# Patient Record
Sex: FEMALE | Race: WHITE | Employment: FULL TIME | URBAN - METROPOLITAN AREA
[De-identification: names, ages, dates, MRNs, and addresses within clinical notes are randomized per-mention and may not be internally consistent; named-entity substitution may affect disease eponyms.]

---

## 2017-01-21 ENCOUNTER — HOSPITAL ENCOUNTER (OUTPATIENT)
Dept: MAMMOGRAPHY | Age: 47
Discharge: HOME | End: 2017-01-21
Attending: FAMILY MEDICINE
Payer: COMMERCIAL

## 2017-01-21 DIAGNOSIS — Z12.31 VISIT FOR SCREENING MAMMOGRAM: ICD-10-CM

## 2017-01-21 PROCEDURE — 77067 SCR MAMMO BI INCL CAD: CPT

## 2017-07-31 PROBLEM — R05.9 COUGH: Status: ACTIVE | Noted: 2017-07-31

## 2018-02-14 PROBLEM — R05.9 COUGH: Status: RESOLVED | Noted: 2017-07-31 | Resolved: 2018-02-14

## 2018-02-24 ENCOUNTER — HOSPITAL ENCOUNTER (OUTPATIENT)
Dept: MAMMOGRAPHY | Age: 48
Discharge: HOME | End: 2018-02-24
Attending: FAMILY MEDICINE
Payer: COMMERCIAL

## 2018-02-24 DIAGNOSIS — Z12.31 VISIT FOR SCREENING MAMMOGRAM: ICD-10-CM

## 2018-02-24 PROCEDURE — 77063 BREAST TOMOSYNTHESIS BI: CPT

## 2019-01-22 PROBLEM — E66.01 SEVERE OBESITY (HCC): Status: ACTIVE | Noted: 2019-01-22

## 2019-04-13 ENCOUNTER — HOSPITAL ENCOUNTER (OUTPATIENT)
Dept: MAMMOGRAPHY | Age: 49
Discharge: HOME | End: 2019-04-13
Attending: FAMILY MEDICINE
Payer: COMMERCIAL

## 2019-04-13 DIAGNOSIS — Z12.39 ENCOUNTER FOR SCREENING BREAST EXAMINATION: ICD-10-CM

## 2019-04-13 PROCEDURE — 77063 BREAST TOMOSYNTHESIS BI: CPT

## 2019-05-20 ENCOUNTER — HOSPITAL ENCOUNTER (OUTPATIENT)
Dept: LAB | Age: 49
Discharge: HOME | End: 2019-05-20

## 2019-05-20 PROCEDURE — 88305 TISSUE EXAM BY PATHOLOGIST: CPT

## 2019-10-30 ENCOUNTER — HOSPITAL ENCOUNTER (OUTPATIENT)
Dept: PHYSICAL THERAPY | Age: 49
Discharge: HOME | End: 2019-10-30
Payer: COMMERCIAL

## 2019-10-30 PROCEDURE — 97161 PT EVAL LOW COMPLEX 20 MIN: CPT

## 2019-10-30 PROCEDURE — 97110 THERAPEUTIC EXERCISES: CPT

## 2019-10-30 NOTE — THERAPY EVALUATION
Mohit Nguyen  : 1970  Payor: Delia Augustin / [place] [facility] / Product Type: PPO /  [address] [place]  at [facility] Therapy  [address], [address]  [phone]   [phone]         OUTPATIENT PHYSICAL THERAPY:Initial Assessment 10/30/2019   ICD-10: Treatment Diagnosis: right knee instability M25.361,   Muscle weakness M62.81  Precautions/Allergies:   Patient has no known allergies. TREATMENT PLAN:  Effective Dates: 10/30/2019 TO 2019 (60 days). Frequency/Duration: 1 time a week for 60 Day(s) and upon reassessment, will adjust frequency and duration as progress indicates. MEDICAL/REFERRING DIAGNOSIS:  Other instability, right knee [M25.361]   DATE OF ONSET: over the summer and recently worsening over the past month  REFERRING PHYSICIAN: Jessy Vaca MD MD Orders: Eval and treat  Return MD Appointment: as needed     INITIAL ASSESSMENT:  Ms. Shannon Carranza presents with increased right knee buckling and feelings of instability in the right knee. She reports she has had no prior injury to the leg and it started back in the summer when exiting her pool by using the stairs. She reports just recently it worsened and was buckling almost every day. She feels hesitant to place all weight on her right leg. Today, she presented with mild quad weakness and will benefit from strengthening to help her prevent any further knee buckling. PROBLEM LIST (Impacting functional limitations):  1. Decreased Strength  2. Decreased Balance INTERVENTIONS PLANNED: (Treatment may consist of any combination of the following)  1. Balance Exercise  2. Home Exercise Program (HEP)  3. Therapeutic Exercise/Strengthening     GOALS: (Goals have been discussed and agreed upon with patient.)  Short-Term Functional Goals: Time Frame: 4 weeks  1. Establish independent HEP with no cuing.   2. Pt will be able to increase strength by 1/2 grade in order to prevent knee buckling with ambulation. 3. Pt will be able to report no difficulty with reciprocal stair climbing of 4-5 steps. Discharge Goals: Time Frame: 8 weeks  1. Pt will be able to improve score on LEFS by at least 5-8 points for advanced ADLs. 2. Pt will be able to increase strength by one full grade in order to prevent knee buckling with ambulation. 3. Pt will be able to demonstrate SLS on right LE up to 25 seconds to increase knee stability at the knee. OUTCOME MEASURE:   Tool Used: Lower Extremity Functional Scale (LEFS)  Score:  Initial: 51/80 Most Recent: X/80 (Date: -- )   Interpretation of Score: 20 questions each scored on a 5 point scale with 0 representing \"extreme difficulty or unable to perform\" and 4 representing \"no difficulty\". The lower the score, the greater the functional disability. 80/80 represents no disability. Minimal detectable change is 9 points. MEDICAL NECESSITY:   · Patient is expected to demonstrate progress in strength and balance to improve her feelings of safety and stability with standing, ambulating and climbing stairs. REASON FOR SERVICES/OTHER COMMENTS:  · Patient continues to require skilled intervention due to lack of full right LE strength needed for proper ambulation without knee instability. Total Duration:  PT Patient Time In/Time Out  Time In: 1210  Time Out: 1250    Rehabilitation Potential For Stated Goals: Good  Regarding Alexandrea Filomena therapy, I certify that the treatment plan above will be carried out by a therapist or under their direction. Thank you for this referral,  Faith Dick, PT     Referring Physician Signature: Sleena Sargent MD No Signature is Required for this note. PAIN/SUBJECTIVE:   Initial: Pain Intensity 1: 0  Post Session:  0/10   HISTORY:   History of Injury/Illness (Reason for Referral):  Ms. Braxton Gusman presents with increased right knee buckling and feelings of instability in the right knee.  She reports she has had no prior injury to the leg and it started back in the summer when exiting her pool by using the stairs. She reports just recently it worsened and was buckling almost every day. She feels hesitant to place all weight on her right leg. Today, she presented with mild quad weakness and will benefit from strengthening to help her prevent any further knee buckling. Past Medical History/Comorbidities:   Ms. Candelario Castañeda  has a past medical history of Depression, Fever blister, and Insomnia. Ms. Candelario Castañeda  has a past surgical history that includes hx hysterectomy (2006). Social History/Living Environment:     pt lives with spouse in one level home  Prior Level of Function/Work/Activity:  Pt is independent with ADL's/hobbies     Ambulatory/Rehab Services H2 Model Falls Risk Assessment   Risk Factors:       No Risk Factors Identified Ability to Rise from Chair:       (0)  Ability to rise in a single movement   Falls Prevention Plan:       No modifications necessary   Total: (5 or greater = High Risk): 0   ©2010 [facility] [place] Patent #[id number]. Federal Law prohibits the replication, distribution or use without written permission from [facility]   Current Medications:       Current Outpatient Medications:     sertraline (ZOLOFT) 100 mg tablet, 1 po qd, Disp: 30 Tab, Rfl: 11    LORazepam (ATIVAN) 1 mg tablet, 1 po bid, Disp: 60 Tab, Rfl: 5    valACYclovir (VALTREX) 1 gram tablet, Take 2 Tabs by mouth two (2) times a day.  X 1 day at onset fever blister   Indications: HERPES LABIALIS, Disp: 12 Tab, Rfl: 2   Date Last Reviewed:  10/30/2019   Number of Personal Factors/Comorbidities that affect the Plan of Care: 1-2: MODERATE COMPLEXITY        EXAMINATION:     ROM:        bilateral knee AROM WFL                                    Strength:     R hip flex 4/5, quad 4/5, HS 4/5, glut med 4/5         L LE 5/5            Special Tests: negative for anterior/posterior drawer testing, negative varus/valgus testing, negative McMurrays testing  Neurological Screen: na  Balance:  fair dynamic SLS      Body Structures Involved:  1. Joints  2. Muscles Body Functions Affected:  1. Neuromusculoskeletal  2. Movement Related Activities and Participation Affected:  1. General Tasks and Demands  2. Mobility  3.  Domestic Life   Number of elements (examined above) that affect the Plan of Care: 3: MODERATE COMPLEXITY   CLINICAL PRESENTATION:   Presentation: Stable and uncomplicated: LOW COMPLEXITY   CLINICAL DECISION MAKING:   Use of outcome tool(s) and clinical judgement create a POC that gives a: Clear prediction of patient's progress: LOW COMPLEXITY

## 2019-10-30 NOTE — PROGRESS NOTES
Oma Landeros  : 1970  Payor: Dee Dee Higuera / [place]: [place] [facility] / Product Type: PPO /  [address] at [facility] Therapy  [address], [address]  [phone]   [phone]      OUTPATIENT PHYSICAL THERAPY: Daily Treatment Note 10/30/2019  Visit Count:  1    ICD-10: Treatment Diagnosis: right knee instability M25.361,   Muscle weakness M62.81    Pre-treatment Symptoms/Complaints:  Pt reporting knee buckling at times. Pain: Initial: Pain Intensity 1: 0  Post Session:  0/10   Medications Last Reviewed:  10/30/2019  Updated Objective Findings:  See evaluation note from today   TREATMENT:   THERAPEUTIC EXERCISE: (20 minutes):  Exercises per grid below to improve strength and balance. Required minimal verbal cues to promote proper body mechanics. Progressed resistance, range and repetitions as indicated. LAQ green TB x 10  Hs curls green TB x 20  6 inch step up and over x 15  SLS on airex bilaterally 2x hold 20 sec  Wall slides x 15  Leg extension unilaterally 15# 2 x 10  Manual Therapy (  na    ): na    Therapeutic Modalities (  na   ):na    Evaluation (x)    HEP: As above; handouts given to patient for all exercises. Treatment/Session Summary:    · Response to Treatment:  Pt seen for evaluation. she presents with increased right LE weakness leading to knee instability and buckling at times. She progressed well today and learned exercises to perform at home and she will return one time per week. .  · Communication/Consultation:  None today  · Equipment provided today:  blue and black theraband  · Recommendations/Intent for next treatment session: Next visit will focus on strengthening as well as SLS tasks.   Treatment Plan of Care Effective Dates:  10/30/19 to 19  Total Treatment Billable Duration:  eval plus 20 min  PT Patient Time In/Time Out  Time In: 1210  Time Out: Bandar Loo PT    Future Appointments   Date Time Provider Ayo Maldonado   11/6/2019  8:00 AM [facility]   11/13/2019  1:00 PM [facility]   11/18/2019  1:00 PM [facility]arte [facility]

## 2019-11-06 ENCOUNTER — HOSPITAL ENCOUNTER (OUTPATIENT)
Dept: PHYSICAL THERAPY | Age: 49
Discharge: HOME | End: 2019-11-06
Payer: COMMERCIAL

## 2019-11-06 PROCEDURE — 97110 THERAPEUTIC EXERCISES: CPT

## 2019-11-11 ENCOUNTER — HOSPITAL ENCOUNTER (OUTPATIENT)
Dept: PHYSICAL THERAPY | Age: 49
Discharge: HOME | End: 2019-11-11
Payer: COMMERCIAL

## 2019-11-11 PROCEDURE — 97110 THERAPEUTIC EXERCISES: CPT

## 2019-11-11 NOTE — PROGRESS NOTES
Oma Landeros  : 1970  Payor: Dee Dee Higuera / [place] [facility] / Product Type: PPO /  [address] [place]  at [address]  [address], [address]  [phone]   [phone]      OUTPATIENT PHYSICAL THERAPY: Daily Treatment Note 2019  Visit Count:  3    ICD-10: Treatment Diagnosis: right knee instability M25.361,   Muscle weakness M62.81    Pre-treatment Symptoms/Complaints:  Patient reports knee isn't buckling, but hurting more on the inside. Pain: Initial: Pain Intensity 1: 0  Post Session:  0/10   Medications Last Reviewed:  2019  Updated Objective Findings:  None Today   TREATMENT:   THERAPEUTIC EXERCISE: (60 minutes):  Exercises per grid below to improve strength and balance. Required minimal verbal cues to promote proper body mechanics. Progressed resistance, range and repetitions as indicated. LAQ green TB x 10  Hs curls green TB x 20  6 inch step up and over x 15  SLS on airex bilaterally 2x hold 20 sec  Wall slides x 15  Leg extension unilaterally 15# 2 x 10  Standing hip abd  With # 4 2 x 12  Sit to stand without assistance 2 x 10  Lateral step ups on 6 inch # x 15  Nu step x 10 min level 2   Hip abd # 4 2 x 10  Knee ext machine # 10 2 x 10  resistive step ups on 6 inch step # 25 x10    Manual Therapy (  na    ): na    Therapeutic Modalities (  na   ):na      HEP: As directed    Treatment/Session Summary:    · Response to Treatment:  Patient tolerated treatment with no discomfort today. Patient demonstrated good effort with all exercises and was pleased that she could do the exercises and her knee felt stronger. Patient indicates that she is doing better with her home exercises. · Communication/Consultation:  None today  · Equipment provided today:  blue and black theraband  · Recommendations/Intent for next treatment session: Next visit will focus on strengthening as well as SLS tasks.   Treatment Plan of Care Effective Dates:  10/30/19 to 12/29/19  Total Treatment Billable Duration:  60 min  PT Patient Time In/Time Out  Time In: 0800  Time Out: 0900  Lonn Fails, PTA    Future Appointments   Date Time Provider Ayo Maldonado   11/18/2019  8:00 AM Edith Aragon [facility]

## 2019-11-18 ENCOUNTER — HOSPITAL ENCOUNTER (OUTPATIENT)
Dept: PHYSICAL THERAPY | Age: 49
Discharge: HOME | End: 2019-11-18
Payer: COMMERCIAL

## 2019-11-18 PROCEDURE — 97110 THERAPEUTIC EXERCISES: CPT

## 2019-11-18 NOTE — PROGRESS NOTES
Natalee Arshad  : 1970  Payor: Jagjit Dumont / Plan: [facility] / Product Type: PPO /  [address] [place]  at [address]  [address], [address]  [phone]   [phone]      OUTPATIENT PHYSICAL THERAPY: Daily Treatment Note 2019  Visit Count:  4    ICD-10: Treatment Diagnosis: right knee instability M25.361,   Muscle weakness M62.81    Pre-treatment Symptoms/Complaints:  Patient reports knee is feeling stronger not as weak as it was. Pain: Initial: Pain Intensity 1: 0  Post Session:  0/10   Medications Last Reviewed:  2019  Updated Objective Findings:  None Today   TREATMENT:   THERAPEUTIC EXERCISE: (60 minutes):  Exercises per grid below to improve strength and balance. Required minimal verbal cues to promote proper body mechanics. Progressed resistance, range and repetitions as indicated. LAQ green TB x 10  Hs curls green TB x 20  6 inch step up and over x 15  SLS on airex bilaterally 2x hold 20 sec  Wall slides x 15  Leg extension unilaterally 15# 2 x 10  Standing hip abd  With # 4 2 x 12  Sit to stand without assistance 2 x 10  Lateral step ups on 6 inch # x 15  Nu step x 10 min level 2   Hip abd # 4 2 x 10  Knee ext machine # 10 2 x 10  resistive step ups on 6 inch step # 25 x 15  Standing hamstring curls with # 4 2 x 12    Manual Therapy (  na    ): na    Therapeutic Modalities (  na   ):na      HEP: As directed    Treatment/Session Summary:    · Response to Treatment:  Patient tolerated treatment with no discomfort today. Patient continues to be pleased with her progress and would like to try a week on her own before she becomes independent with all exercises. · Communication/Consultation:  None today  · Equipment provided today:  blue and black theraband  · Recommendations/Intent for next treatment session: Next visit will focus on strengthening as well as SLS tasks.   Treatment Plan of Care Effective Dates: 10/30/19 to 12/29/19  Total Treatment Billable Duration:  60 min  PT Patient Time In/Time Out  Time In: 0800  Time Out: 0900  Neel Bajwa PTA    Future Appointments   Date Time Provider Ayo Maldonado   12/2/2019  8:00 AM [facility]

## 2019-12-02 ENCOUNTER — HOSPITAL ENCOUNTER (OUTPATIENT)
Dept: PHYSICAL THERAPY | Age: 49
Discharge: HOME | End: 2019-12-02
Payer: COMMERCIAL

## 2019-12-02 PROCEDURE — 97110 THERAPEUTIC EXERCISES: CPT

## 2019-12-02 NOTE — PROGRESS NOTES
Oma Landeros  : 1970  Payor: Dee Dee Higuera / [place] [facility] / Product Type: PPO /  [address] [place]  at [place] [address] Therapy  [address], [address]  [phone]   [phone]      OUTPATIENT PHYSICAL THERAPY: Daily Treatment Note 2019  Visit Count:  5    ICD-10: Treatment Diagnosis: right knee instability M25.361,   Muscle weakness M62.81    Pre-treatment Symptoms/Complaints:  Patient reports knee hasn't buckled once since her last visit. Pain: Initial: Pain Intensity 1: 0  Post Session:  0/10   Medications Last Reviewed:  2019  Updated Objective Findings:  None Today   TREATMENT:   THERAPEUTIC EXERCISE: (60 minutes):  Exercises per grid below to improve strength and balance. Required minimal verbal cues to promote proper body mechanics. Progressed resistance, range and repetitions as indicated. LAQ green TB x 10  Hs curls green TB x 20  6 inch step up and over x 15  SLS on airex bilaterally 2x hold 20 sec  Wall slides x 15  Leg extension unilaterally 15# 2 x 10  Standing hip abd  With # 4 2 x 12  Sit to stand without assistance 2 x 10  Lateral step ups on 6 inch # x 15  Nu step x 10 min level 2   Hip abd # 4 2 x 10  Knee ext machine # 10 2 x 10  resistive step ups on 6 inch step # 25 x 15  Standing hamstring curls with # 4 2 x 12  Resistive side stepping with # 35 x 8 bilateral    Manual Therapy (  na    ): na    Therapeutic Modalities (  na   ):na      HEP: As directed    Treatment/Session Summary:    · Response to Treatment:  Patient tolerated treatment with no discomfort today. Patient indicated that she road a bike 19 miles over the weekend and her knee felt great. Patient continues to be pleased with her progress and hopes after her next visit she will be able to be independent with all exercises.    · Communication/Consultation:  None today  · Equipment provided today:  None today and wedge for stretching. · Recommendations/Intent for next treatment session: Next visit will focus on strengthening as well as SLS tasks.   Treatment Plan of Care Effective Dates:  10/30/19 to 12/29/19  Total Treatment Billable Duration:  60 min  PT Patient Time In/Time Out  Time In: 0800  Time Out: 0900  Luis Manuel Obrien PTA    Future Appointments   Date Time Provider Ayo Maldonado   12/4/2019 12:00 PM Fransico Weller, PT [facility]

## 2019-12-09 ENCOUNTER — HOSPITAL ENCOUNTER (OUTPATIENT)
Dept: PHYSICAL THERAPY | Age: 49
Discharge: HOME | End: 2019-12-09
Payer: COMMERCIAL

## 2019-12-09 PROCEDURE — 97110 THERAPEUTIC EXERCISES: CPT

## 2019-12-09 NOTE — THERAPY DISCHARGE
Uday Tyler  : 1970  Payor: Lili Earing / Plan: [facility] / Product Type: PPO /  [address] [place]  at [facility] Therapy  [address], [address]  [phone]   [phone]         OUTPATIENT PHYSICAL THERAPY:Discharge Summary 2019   ICD-10: Treatment Diagnosis: right knee instability M25.361,   Muscle weakness M62.81  Precautions/Allergies:   Patient has no known allergies. TREATMENT PLAN:  Effective Dates: 10/30/2019 TO 2019 (60 days). Frequency/Duration: 1 time a week for 60 Day(s) and upon reassessment, will adjust frequency and duration as progress indicates. MEDICAL/REFERRING DIAGNOSIS:  Other instability, right knee [M25.361]   DATE OF ONSET: over the summer and recently worsening over the past month  REFERRING PHYSICIAN: Lydia Alejandre MD MD Orders: Eval and treat  Return MD Appointment: as needed     INITIAL ASSESSMENT:  Ms. Michael Resendez presents with increased right knee buckling and feelings of instability in the right knee. She reports she has had no prior injury to the leg and it started back in the summer when exiting her pool by using the stairs. She reports just recently it worsened and was buckling almost every day. She feels hesitant to place all weight on her right leg. Today, she presented with mild quad weakness and will benefit from strengthening to help her prevent any further knee buckling. Assessment as of : Pt presenting with no pain or knee buckling over the past several weeks. She has 4+/5 strength and is able to walk and ride bicycle without difficulty. She has met her therapy goals and was discharged today. PROBLEM LIST (Impacting functional limitations):  1. Decreased Strength  2. Decreased Balance INTERVENTIONS PLANNED: (Treatment may consist of any combination of the following)  1. Balance Exercise  2. Home Exercise Program (HEP)  3.  Therapeutic Exercise/Strengthening     GOALS: (Goals have been discussed and agreed upon with patient.)  Short-Term Functional Goals: Time Frame: 4 weeks  1. Establish independent HEP with no cuing.-met  2. Pt will be able to increase strength by 1/2 grade in order to prevent knee buckling with ambulation. -met  3. Pt will be able to report no difficulty with reciprocal stair climbing of 4-5 steps. -met  Discharge Goals: Time Frame: 8 weeks  1. Pt will be able to improve score on LEFS by at least 5-8 points for advanced ADLs. -met  2. Pt will be able to increase strength by one full grade in order to prevent knee buckling with ambulation. -met  3. Pt will be able to demonstrate SLS on right LE up to 25 seconds to increase knee stability at the knee. -met    OUTCOME MEASURE:   Tool Used: Lower Extremity Functional Scale (LEFS)  Score:  Initial: 51/80 Most Recent: -80/80 (Date: 12/9/19 )   Interpretation of Score: 20 questions each scored on a 5 point scale with 0 representing \"extreme difficulty or unable to perform\" and 4 representing \"no difficulty\". The lower the score, the greater the functional disability. 80/80 represents no disability. Minimal detectable change is 9 points.     Thank you for this referral,  Sloane Eckert, PT

## 2019-12-09 NOTE — PROGRESS NOTES
Josephine Primer  : 1970  Payor: Brian Clayton / [place] [facility] / Product Type: PPO /  [address] [place]  at [address], [address]  [phone]   [phone]      OUTPATIENT PHYSICAL THERAPY: Daily Treatment Note 2019  Visit Count:  6    ICD-10: Treatment Diagnosis: right knee instability M25.361,   Muscle weakness M62.81    Pre-treatment Symptoms/Complaints:  Patient reports no issues. Pain: Initial: Pain Intensity 1: 0  Post Session:  0/10   Medications Last Reviewed:  2019  Updated Objective Findings:  see discharge summary   TREATMENT:   THERAPEUTIC EXERCISE: (44 minutes):  Exercises per grid below to improve strength and balance. Required minimal verbal cues to promote proper body mechanics. Progressed resistance, range and repetitions as indicated. Bike x 6 min  Hs curls 20# x 20  Bilateral hip abduction 25# x 20  Bilateral hip extension 37.5# x 20  Mini squats x 20  Sit-stand x 15  8 inch step up x 25  Slant board 3x hold 30 sec  HS stretching bilaterally 4x hold 20 sec  Leg extension 15# x 25      Manual Therapy (  na    ): na    Therapeutic Modalities (  na   ):na      HEP: As directed    Treatment/Session Summary:    · Response to Treatment:  Patient reporting no increased pain and no knee buckling over the past several weeks. She is ready for discharge and has HEP and bands to continue using at home. · Communication/Consultation:  None today  Treatment Plan of Care Effective Dates:  10/30/19 to 19  Total Treatment Billable Duration:  44 min  PT Patient Time In/Time Out  Time In: 1200  Time Out: 830 [facility], PT    No future appointments.

## 2020-01-08 ENCOUNTER — HOSPITAL ENCOUNTER (OUTPATIENT)
Dept: PHYSICAL THERAPY | Age: 50
Discharge: HOME | End: 2020-01-08
Payer: COMMERCIAL

## 2020-01-08 PROCEDURE — 97162 PT EVAL MOD COMPLEX 30 MIN: CPT

## 2020-01-08 PROCEDURE — 97012 MECHANICAL TRACTION THERAPY: CPT

## 2020-01-08 NOTE — THERAPY EVALUATION
Humberto Arias  : 1970  Payor: Samy Ryan / Plan: [facility] / Product Type: PPO /  [address]  at [facility] Therapy  [address], [facility], [address]  [phone]   [phone]         OUTPATIENT PHYSICAL THERAPY:Initial Assessment 2020   ICD-10: Treatment Diagnosis: cervicalgia M54.2, cervical disorder with radiculopathy M50.12, muscle spasm of neck M62.838  Precautions/Allergies:   Patient has no known allergies. TREATMENT PLAN:  Effective Dates: 2020 TO 3/8/2020 (60 days). Frequency/Duration: 2 times a week for 60 Day(s) and upon reassessment, will adjust frequency and duration as progress indicates. MEDICAL/REFERRING DIAGNOSIS:  Cervicalgia [M54.2]   DATE OF ONSET: before Y[place]  REFERRING PHYSICIAN: Bing Sarah MD MD Orders: Eval and treat  Return MD Appointment: as needed     INITIAL ASSESSMENT:  Ms. Jono Florian presents with severe left sided cervical, shoulder blade and L UE pain due to effects of DDD with disc bulging and cord contact from roughly C3-C7. She reported feeling this way a week before [place] and her pain has continued since then. She has had prior cervical pain, but never this severe. She reported helping her son lift her garage door and then she worked heavily in her bathroom breaking tile and carrying tile. She thinks these two activities may have exacerbated her pain levels. She has undergone MRI and is being referred to Dr. Harriett Gasca next Monday. She has not been able to wash her hair, back or perform housework or her daily job. PROBLEM LIST (Impacting functional limitations):  1. Decreased Strength  2. Decreased ADL/Functional Activities  3. Increased Pain  4. Decreased Flexibility/Joint Mobility INTERVENTIONS PLANNED: (Treatment may consist of any combination of the following)  1. Electrical Stimulation  2. Heat  3. Home Exercise Program (HEP)  4. Manual Therapy  5.  Range of Motion (ROM)  6. Therapeutic Exercise/Strengthening  7. Transcutaneous Electrical Nerve Stimulation (TENS)  8. Ultrasound (US)  9. cervical traction     GOALS: (Goals have been discussed and agreed upon with patient.)  Short-Term Functional Goals: Time Frame: 4 weeks  1. Establish independent HEP with no cuing. 2. Pt will be able to wash her hair and back. 3. Pt will be able to return to work. Discharge Goals: Time Frame: 8 weeks  1. Pt will be able to improve score on NDI by at least 5 points for advanced ADL's.  2. Pt will be able to improve cervical ROM by at least 10 degrees for improved neck rotation for driving. 3. Pt will be able to decrease pain to at least 3/10 with daily tasks. 4. Pt will be able to resume housework and cooking. OUTCOME MEASURE:   Tool Used: Neck Disability Index (NDI)  Score:  Initial: 36/50  Most Recent: X/50 (Date: -- )   Interpretation of Score: The Neck Disability Index is a revised form of the Oswestry Low Back Pain Index and is designed to measure the activities of daily living in person's with neck pain. Each section is scored on a 0-5 scale, 5 representing the greatest disability. The scores of each section are added together for a total score of 50. MEDICAL NECESSITY:   · Patient is expected to demonstrate progress in strength and range of motion to improve pain levels and allow pt to return to her normal ADL's and to return to work. REASON FOR SERVICES/OTHER COMMENTS:  · Patient continues to require skilled intervention due to severe pain limiting her ability to sleep, perform her job and perform her normal housework. Total Duration:  PT Patient Time In/Time Out  Time In: 1145  Time Out: 1255    Rehabilitation Potential For Stated Goals: Good  Regarding Terrosy Hornneemam therapy, I certify that the treatment plan above will be carried out by a therapist or under their direction.   Thank you for this referral,  Joao Gaming, PT     Referring Physician Signature: Gonzalo Hager, Javy Chinchilla MD No Signature is Required for this note. PAIN/SUBJECTIVE:   Initial: Pain Intensity 1: 9  Post Session:  8/10   HISTORY:   History of Injury/Illness (Reason for Referral):  Ms. Rakan Albright presents with severe left sided cervical, shoulder blade and L UE pain due to effects of DDD with disc bulging and cord contact from roughly C3-C7. She reported feeling this way a week before Christmas and her pain has continued since then. She has had prior cervical pain, but never this severe. She reported helping her son lift her garage door and then she worked heavily in her bathroom breaking tile and carrying tile. She thinks these two activities may have exacerbated her pain levels. She has undergone MRI and is being referred to Dr. Ady Renner next Monday. She has not been able to wash her hair, back or perform housework or her daily job. She did have one deep tissue massage with only mid relief and four adjustments by chiropractor without much relief. Past Medical History/Comorbidities:   Ms. Rakan Albright  has a past medical history of Depression, Fever blister, and Insomnia. Ms. Rakan Albright  has a past surgical history that includes hx hysterectomy (2006). 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  Federal Law prohibits the replication, distribution or use without written permission from [facility] [employer]   Current Medications:       Current Outpatient Medications:     sertraline (ZOLOFT) 100 mg tablet, 1 po qd, Disp: 30 Tab, Rfl: 11    LORazepam (ATIVAN) 1 mg tablet, 1 po bid, Disp: 60 Tab, Rfl: 5    valACYclovir (VALTREX) 1 gram tablet, Take 2 Tabs by mouth two (2) times a day. X 1 day at onset fever blister   Indications: HERPES LABIALIS, Disp: 12 Tab, Rfl: 2   Flexiril, gabapentin, aleve, oxycodone   Date Last Reviewed:  1/8/2020     Number of Personal Factors/Comorbidities that affect the Plan of Care: 1-2: MODERATE COMPLEXITY        EXAMINATION:   Palpation:          Increased pain and tightness noted at left upper trap as well as medial border of left scapula  ROM:     CROM-FB WFL today with pain  R Rot 42 ° with pain  L Rot 25 ° with pain  Bilateral SB WFL and less painful     R UE WFL  L UE WFL, but guarded with motions                           Strength:     R UE 5/5         L UE flex 3/5, abd 3-/5, ER 3-/5, IR 3/5, biceps 4-/5, triceps 4-/5            Special Tests: na  Neurological Screen: positive for radicular symptoms in left UE- left index finger numb over the past week  Balance:  good      Body Structures Involved:  1. Nerves  2. Bones  3. Joints  4. Muscles Body Functions Affected:  1. Sensory/Pain  2. Neuromusculoskeletal  3. Movement Related Activities and Participation Affected:  1. General Tasks and Demands  2. Self Care  3. Domestic Life  4. Interpersonal Interactions and Relationships  5.  Community, Social and [place]   Number of elements (examined above) that affect the Plan of Care: 4+: HIGH COMPLEXITY   CLINICAL PRESENTATION:   Presentation: Evolving clinical presentation with changing clinical characteristics: MODERATE COMPLEXITY   CLINICAL DECISION MAKING:   Use of outcome tool(s) and clinical judgement create a POC that gives a: Questionable prediction of patient's progress: MODERATE COMPLEXITY

## 2020-01-08 NOTE — PROGRESS NOTES
Nela Alamo  : 1970  Payor: Wiley Santos / [place] [facility] / Product Type: PPO /  [address] at [facility] Therapy  [address], [address]  [phone]   [phone]      OUTPATIENT PHYSICAL THERAPY: Daily Treatment Note 2020  Visit Count:  1    ICD-10: Treatment Diagnosis: cervicalgia M54.2, cervical disorder with radiculopathy M50.12, muscle spasm of neck M62.838    Pre-treatment Symptoms/Complaints:  Pt reporting feeling severe pain on left side of neck and into left UE. She is very guarded  Pain: Initial: Pain Intensity 1: 9  Post Session:  8/10   Medications Last Reviewed:  2020  Updated Objective Findings:  See evaluation note from today   TREATMENT:   THERAPEUTIC EXERCISE: (0 minutes):  Exercises per grid below to improve mobility. Required minimal verbal cues to promote proper body mechanics. Progressed resistance, range and repetitions as indicated. Manual Therapy ( na     ): na    Therapeutic Modalities (    30 min ):cervical traction x 15 minutes at 20# for improved pain control followed by heat and premod e-stim to left cervical and shoulder region x 15 min. Pt checked on for comfort    Evaluation (x)    HEP: As above; handouts given to patient for all exercises. Treatment/Session Summary:    · Response to Treatment:  Pt seen for evaluation today. She had severe pain in the neck and left UE. She received traction and e-stim to help decrease her high pain levels. Post treatment, gross ROM observation showed slight imrpovement in bilateral rotation and extension. She still has severe pain levels. She will perform light ROM exercises at home and continue use of meds and heat at home. · Communication/Consultation:  None today  · Equipment provided today:  None today  · Recommendations/Intent for next treatment session: Next visit will focus on traction, e-stim, manual therapy.   Treatment Plan of Care Effective Dates:  1/8/2020 to 3/8/2020  Total Treatment Billable Duration:  eval plus 15 min  PT Patient Time In/Time Out  Time In: 1730  Time Out: 57 Radha Bauer PT    Future Appointments   Date Time Provider Ayo Maldonado   1/10/2020 12:15 PM Noel Cardona, PT [facility]   1/13/2020  1:00 PM Júnior Whitney [facility]   1/16/2020 11:00 AM Noel Cardona, PT [facility]

## 2020-01-10 ENCOUNTER — HOSPITAL ENCOUNTER (OUTPATIENT)
Dept: PHYSICAL THERAPY | Age: 50
Discharge: HOME | End: 2020-01-10
Payer: COMMERCIAL

## 2020-01-10 PROCEDURE — 97140 MANUAL THERAPY 1/> REGIONS: CPT

## 2020-01-10 PROCEDURE — 97012 MECHANICAL TRACTION THERAPY: CPT

## 2020-01-10 NOTE — PROGRESS NOTES
Trinity Rosenbergsitavíctor  : 1970  Payor: Delena Boeck / [place] [facility] / Product Type: PPO /  [address]  at [facility] Therapy  [address], [address]  [phone]   [phone]      OUTPATIENT PHYSICAL THERAPY: Daily Treatment Note 1/10/2020  Visit Count:  2    ICD-10: Treatment Diagnosis: cervicalgia M54.2, cervical disorder with radiculopathy M50.12, muscle spasm of neck M62.838    Pre-treatment Symptoms/Complaints:  Pt reporting her left arm is now tingling and has pins and needles sensation in it. Pain: Initial: Pain Intensity 1: 9  Post Session:  8/10   Medications Last Reviewed:  1/10/2020  Updated Objective Findings:  None Today   TREATMENT:   THERAPEUTIC EXERCISE: (5 minutes):  Exercises per grid below to improve mobility. Required minimal verbal cues to promote proper body mechanics. Progressed resistance, range and repetitions as indicated. Supine cervical retractions x 15 and review of HEP  Manual Therapy ( 15 min    ): manual traction and shoulder depression stretching. Pt in supine    Therapeutic Modalities (    30 min ):cervical traction x 15 minutes at 15# for improved pain control followed by heat and 2 channel premod e-stim to left cervical and shoulder region x 15 min. Pt checked on for comfort  5 min set up for traction today to help pt get more comfortable  HEP: continue as directed    Treatment/Session Summary:    · Response to Treatment:  Pt received light manual traction on table with shoulder depression stretching. She felt better with the shoulder depression stretching. she continues to have paresthesia down the left arm and it seems to have intensified. post traction today, pt did not report any arm pain or numbness or tingling. She will continue to monitor this and will see Beverley on Monday. .  · Communication/Consultation:  None today  · Equipment provided today:  None today  · Recommendations/Intent for next treatment session: Next visit will focus on traction, e-stim, manual therapy.   Treatment Plan of Care Effective Dates:  1/8/2020 to 3/8/2020  Total Treatment Billable Duration:  55 min  PT Patient Time In/Time Out  Time In: 5221  Time Out: 9 Juliane Rodriguez, PT    Future Appointments   Date Time Provider Ayo Maldonado   1/14/2020 10:45 AM ZACHARIAH Hemphill   1/16/2020 11:00 [facility]

## 2020-01-13 ENCOUNTER — APPOINTMENT (OUTPATIENT)
Dept: PHYSICAL THERAPY | Age: 50
End: 2020-01-13
Payer: COMMERCIAL

## 2020-01-14 ENCOUNTER — HOSPITAL ENCOUNTER (OUTPATIENT)
Dept: PHYSICAL THERAPY | Age: 50
Discharge: HOME | End: 2020-01-14
Payer: COMMERCIAL

## 2020-01-14 PROCEDURE — 97140 MANUAL THERAPY 1/> REGIONS: CPT

## 2020-01-14 PROCEDURE — 97012 MECHANICAL TRACTION THERAPY: CPT

## 2020-01-14 NOTE — PROGRESS NOTES
Nela Alamo  : 1970  Payor: Wiley Santos / [place] [facility] / Product Type: PPO /  [address]  at [place] [address] Therapy  [address], [address]  [phone]   [phone]      OUTPATIENT PHYSICAL THERAPY: Daily Treatment Note 2020  Visit Count:  3    ICD-10: Treatment Diagnosis: cervicalgia M54.2, cervical disorder with radiculopathy M50.12, muscle spasm of neck M62.838    Pre-treatment Symptoms/Complaints:  Pt reporting no current arm pain or tingling. She did see Dr. Tristin Bell and he diagnosed pt with herniated disc. She is to take prednisone and continue with therapy. She will see him again in the next several weeks. Pain: Initial: Pain Intensity 1: 5  Post Session:  510   Medications Last Reviewed:  2020  Updated Objective Findings:  None Today   TREATMENT:   THERAPEUTIC EXERCISE: (5 minutes):  Exercises per grid below to improve mobility. Required minimal verbal cues to promote proper body mechanics. Progressed resistance, range and repetitions as indicated. Supine cervical retractions x 15 and review of HEP  Manual Therapy ( 15 min    ): manual traction and shoulder depression stretching. Pt in supine  Manual cervical retraction/extension mobs 2 x 10  Therapeutic Modalities (    30 min ):cervical traction x 15 minutes at 15# for improved pain control followed by heat and 2 channel premod e-stim to left cervical and shoulder region x 15 min. Pt checked on for comfort  5 min set up for traction today to help pt get more comfortable  HEP: continue as directed    Treatment/Session Summary:    · Response to Treatment:  Pt reporting 5/10 pain rating versuse 9/10 pain rating today. she was able to return to 1/2 days at work, but by the end of working 4 hours she has increased cervical pain. She has moved her computer monitor to avoid excessive cervical extension.   .  · Communication/Consultation:  None today  · Equipment provided today:  None today  · Recommendations/Intent for next treatment session: Next visit will focus on traction, e-stim, manual therapy.   Treatment Plan of Care Effective Dates:  1/8/2020 to 3/8/2020  Total Treatment Billable Duration:  55 min  PT Patient Time In/Time Out  Time In: 8339  Time Out: Alexander 35, PT    Future Appointments   Date Time Provider Ayo Maldonado   1/16/2020 11:00 AM Cal Reyes, PT [facility]   1/20/2020 10:00 AM Choudhury [place] [facility]   1/23/2020 10:00 AM Choudhury [place] [facility]

## 2020-01-16 ENCOUNTER — HOSPITAL ENCOUNTER (OUTPATIENT)
Dept: PHYSICAL THERAPY | Age: 50
Discharge: HOME | End: 2020-01-16
Payer: COMMERCIAL

## 2020-01-16 PROCEDURE — 97012 MECHANICAL TRACTION THERAPY: CPT

## 2020-01-16 PROCEDURE — 97140 MANUAL THERAPY 1/> REGIONS: CPT

## 2020-01-16 NOTE — PROGRESS NOTES
Nela Alamo  : 1970  Payor: Wiley Santos / [place] [facility] / Product Type: PPO /  [address] at [facility] Therapy  [address], [address]  [phone]   [phone]      OUTPATIENT PHYSICAL THERAPY: Daily Treatment Note 2020  Visit Count:  4    ICD-10: Treatment Diagnosis: cervicalgia M54.2, cervical disorder with radiculopathy M50.12, muscle spasm of neck M62.838    Pre-treatment Symptoms/Complaints:  Pt reporting she is seeing some improvement. The neck is still very stiff and painful at times. She currently has no arm pain. She has been working 1/2 days thus far. Pain: Initial: Pain Intensity 1: 5  Post Session:  5/10   Medications Last Reviewed:  2020  Updated Objective Findings:  None Today   TREATMENT:   THERAPEUTIC EXERCISE: (5 minutes):  Exercises per grid below to improve mobility. Required minimal verbal cues to promote proper body mechanics. Progressed resistance, range and repetitions as indicated. Supine cervical retractions x 15 and review of HEP  Manual Therapy ( 20 min    ): manual traction and shoulder depression stretching. Pt in supine  Manual cervical retraction/extension mobs 2 x 10. Light STM and shoulder depression in sideyling with scap mobs to work on decreasing tension around the scapula  Therapeutic Modalities (    30 min ):cervical traction x 20 minutes at 19# for improved pain control followed by heat and 2 channel premod e-stim to left cervical and shoulder region x 15 min. Pt checked on for comfort  5 min set up for traction today to help pt get more comfortable  HEP: add upper trap stretching    Treatment/Session Summary:    · Response to Treatment:  Pt reporting feeling intermittent stiffness. No current arm pain. She is concerned she might have to have surgery. Pt has been diligent in performing HEP and using heat and medication to decrease her pain levels.   She appears more alert and can now move her neck up and down with improved ROM and less pain. She is only working 1/2 days currently. We will begin to add in more stretching and possibly light strengthening if her pain levels continue to improve.   · Communication/Consultation:  None today  · Equipment provided today:  None today  · Recommendations/Intent for next treatment session: Next visit will focus on traction, e-stim, manual therapy, light stretching and strengthening  Treatment Plan of Care Effective Dates:  1/8/2020 to 3/8/2020  Total Treatment Billable Duration:  50 min  PT Patient Time In/Time Out  Time In: 1100  Time Out: Bandar Loo, PT    Future Appointments   Date Time Provider Ayo Maldonado   1/20/2020 10:00 AM Coco Harris [facility]   1/23/2020 10:00 AM Coco Harris [facility]

## 2020-01-20 ENCOUNTER — HOSPITAL ENCOUNTER (OUTPATIENT)
Dept: PHYSICAL THERAPY | Age: 50
Discharge: HOME | End: 2020-01-20
Payer: COMMERCIAL

## 2020-01-20 PROCEDURE — 97140 MANUAL THERAPY 1/> REGIONS: CPT

## 2020-01-20 PROCEDURE — 97014 ELECTRIC STIMULATION THERAPY: CPT

## 2020-01-20 PROCEDURE — 97012 MECHANICAL TRACTION THERAPY: CPT

## 2020-01-20 NOTE — PROGRESS NOTES
Rosamond Meckel  : 1970  Payor: Amanda Del Toro / Plan: [facility] / Product Type: PPO /  [address] at [facility] Therapy  [address], [address]  [phone]   [phone]      OUTPATIENT PHYSICAL THERAPY: Daily Treatment Note 2020  Visit Count:  5    ICD-10: Treatment Diagnosis: cervicalgia M54.2, cervical disorder with radiculopathy M50.12, muscle spasm of neck M62.838    Pre-treatment Symptoms/Complaints:  Patient reports she had a really rough weekend with her neck and down in her shoulder. Pain: Initial: Pain Intensity 1: 5  Post Session:  4/10   Medications Last Reviewed:  2020  Updated Objective Findings:  None Today   TREATMENT:   THERAPEUTIC EXERCISE: (5 minutes):  Exercises per grid below to improve mobility. Required minimal verbal cues to promote proper body mechanics. Progressed resistance, range and repetitions as indicated. Supine cervical retractions x 15 and review of HEP  Manual Therapy ( 20 min    ): manual traction and shoulder depression stretching. Pt in supine  Manual cervical retraction/extension mobs 2 x 10. Light STM and shoulder depression in sideyling with scap mobs to work on decreasing tension around the scapula  Therapeutic Modalities (    35 min ):cervical traction x 20 minutes at 19# for improved pain control followed by heat and 2 channel premod e-stim to left cervical and shoulder region x 15 min. Pt checked on for comfort    HEP: add upper trap stretching    Treatment/Session Summary:    · Response to Treatment:  Patient tolerated treatment with mild discomfort today. She reports that she has had some tingling down her arm. Tried taping today to see if that would give her some discomfort while at work. Patient continues to be diligent with all home exercises.   · Communication/Consultation:  None today  · Equipment provided today:  None today  · Recommendations/Intent for next treatment session: Next visit will focus on traction, e-stim, manual therapy, light stretching and strengthening  Treatment Plan of Care Effective Dates:  1/8/2020 to 3/8/2020  Total Treatment Billable Duration:  60 min  PT Patient Time In/Time Out  Time In: 1000  Time Out: [address] [place], [place]    Future Appointments   Date Time Provider Ayo Maldonado   1/23/2020 10:00 AM [facility]   1/27/2020 10:15 AM [place] Galliano, PT [facility]   1/30/2020  1:15 PM [place] Galliano, PT [facility]   2/4/2020 12:15 PM [place] Galliano, PT [facility]   2/7/2020 12:15 PM [place] Galliano, PT [facility]   2/11/2020 12:15 PM [place] Galliano, PT [facility]   2/14/2020 12:15 PM [place] Galliano, PT [facility]

## 2020-01-23 ENCOUNTER — HOSPITAL ENCOUNTER (OUTPATIENT)
Dept: PHYSICAL THERAPY | Age: 50
Discharge: HOME | End: 2020-01-23
Payer: COMMERCIAL

## 2020-01-23 PROCEDURE — 97012 MECHANICAL TRACTION THERAPY: CPT

## 2020-01-23 PROCEDURE — 97110 THERAPEUTIC EXERCISES: CPT

## 2020-01-23 PROCEDURE — 97140 MANUAL THERAPY 1/> REGIONS: CPT

## 2020-01-23 NOTE — PROGRESS NOTES
Milderd Majestic  : 1970  Payor: Bianca Turk / Plan: [facility] [employer] [facility] / Product Type: PPO /  [address]  at [facility] Therapy  [address], [address]  [phone]   [phone]      OUTPATIENT PHYSICAL THERAPY: Daily Treatment Note 2020  Visit Count:  6    ICD-10: Treatment Diagnosis: cervicalgia M54.2, cervical disorder with radiculopathy M50.12, muscle spasm of neck M62.838    Pre-treatment Symptoms/Complaints:  Patient reports she is feeling better. Slight numbness in left index finger remains and slight headache on top of the head. She appears to be more comfortable and less guarded today. Pain: Initial: Pain Intensity 1: 4  Post Session:  3/10   Medications Last Reviewed:  2020  Updated Objective Findings:  None Today   TREATMENT:   THERAPEUTIC EXERCISE: (10 minutes):  Exercises per grid below to improve mobility. Required minimal verbal cues to promote proper body mechanics. Progressed resistance, range and repetitions as indicated. Scap retract blue TB 2 x 10  Cervical retraction with rotation x 10  Prone on elbows with cervical extension x 10 hold 5 sec  Manual Therapy ( 15 min    ): manual traction and shoulder depression stretching. Pt in supine  Manual cervical retraction/extension mobs 2 x 10. Light STM and shoulder depression in sideyling with scap mobs to work on decreasing tension around the scapula  Therapeutic Modalities (    30 min ):cervical traction x 15 minutes at 20# for improved pain control followed by heat and 2 channel premod e-stim to left cervical and shoulder region x 15 min. Pt checked on for comfort    HEP: add scap retract    Treatment/Session Summary:    · Response to Treatment:  Patient reporting feeling less overall neck and arm pain. She is much more comfortable today and has been able to return to working full days. She has a riser desk that she can adjust to stand or to sit down.   She does have some residual numbness in the left index finger pad. She also feels tight at times in the neck. She can now turn more comfortably to the right and left to look over her shoulders. She is progressing well and was able to complete new exercises todayt without increased pain.  Communication/Consultation:  None today  · Equipment provided today:  None today  · Recommendations/Intent for next treatment session: Next visit will focus on traction, e-stim, manual therapy, light stretching and strengthening  Treatment Plan of Care Effective Dates:  1/8/2020 to 3/8/2020  Total Treatment Billable Duration:  60 min  PT Patient Time In/Time Out  Time In: 0100  Time Out: Kojo Mckinley [place] [address], PT    Future Appointments   Date Time Provider Ayo Maldonado   1/27/2020 10:15 AM Matt Rung, PT [facility]   1/30/2020  1:15 PM Matt Rung, PT [facility]   2/4/2020 11:00 AM Matt Rung, PT [facility]   2/7/2020 11:00 AM Matt Rung, PT [facility]   2/11/2020 11:15 AM Matt Rung, PT [facility]   2/14/2020 11:15 AM Matt Rung, PT [facility]

## 2020-01-27 ENCOUNTER — HOSPITAL ENCOUNTER (OUTPATIENT)
Dept: PHYSICAL THERAPY | Age: 50
Discharge: HOME | End: 2020-01-27
Payer: COMMERCIAL

## 2020-01-27 PROCEDURE — 97014 ELECTRIC STIMULATION THERAPY: CPT

## 2020-01-27 PROCEDURE — 97110 THERAPEUTIC EXERCISES: CPT

## 2020-01-27 PROCEDURE — 97140 MANUAL THERAPY 1/> REGIONS: CPT

## 2020-01-27 PROCEDURE — 97035 APP MDLTY 1+ULTRASOUND EA 15: CPT

## 2020-01-27 NOTE — PROGRESS NOTES
Trinity Liu  : 1970  Payor: Delena Boeck / Plan: [facility] / Product Type: PPO /  [address]  at [facility]  [address], [address]  [phone]   [phone]      OUTPATIENT PHYSICAL THERAPY: Daily Treatment Note 2020  Visit Count:  7    ICD-10: Treatment Diagnosis: cervicalgia M54.2, cervical disorder with radiculopathy M50.12, muscle spasm of neck M62.838    Pre-treatment Symptoms/Complaints:  Patient reports she has been sore in the left pectoral region along T1 dermatome. Pain: Initial: Pain Intensity 1: 4  Post Session:  3/10   Medications Last Reviewed:  2020  Updated Objective Findings:  None Today   TREATMENT:   THERAPEUTIC EXERCISE: (15 minutes):  Exercises per grid below to improve mobility. Required minimal verbal cues to promote proper body mechanics. Progressed resistance, range and repetitions as indicated. Scap retract blue TB 2 x 10  Shoulder pull downs green TB x 20  Cervical retraction with rotation x 10-held  Nu-step x 7 min  Manual Therapy ( 15 min    ): manual traction and shoulder depression stretching. Pt in supine  Manual cervical retraction/extension mobs 2 x 10. Light STM and shoulder depression in sideyling with scap mobs to work on decreasing tension around the scapula  Therapeutic Modalities (    35 min ):cervical traction x 20 minutes at 20# for improved pain control followed by heat and 2 channel premod e-stim to left cervical and shoulder region x 15 min. Pt checked on for comfort    HEP: na    Treatment/Session Summary:    · Response to Treatment:  Patient reporting no increased pain with treatment today. She did report feeling some soreness in the left pectoral region over the weekend and after last treatment session. Today, we progressed pt to nu-step for upper arm motions and to shoulder pull downs to work on arm strength and stressing the arm and neck musculature.   She is progressing well thus far and is now more relaxed in the neck and can turn more easily for driving.   ·  Communication/Consultation:  None today  · Equipment provided today:  None today  · Recommendations/Intent for next treatment session: Next visit will focus on traction, e-stim, manual therapy, light stretching and strengthening  Treatment Plan of Care Effective Dates:  1/8/2020 to 3/8/2020  Total Treatment Billable Duration:  65 min  PT Patient Time In/Time Out  Time In: 1015  Time Out: [address] PT    Future Appointments   Date Time Provider Ayo Maldonado   1/30/2020  1:15 PM Constantino Hankins, PT [facility]   2/4/2020 11:00 AM Constantino Hankins, PT [facility]   2/7/2020 11:00 AM Constantino Hankins, PT [facility]   2/11/2020 11:15 AM Constantino Hankins, PT [facility]   2/14/2020 11:15 AM Constantino Hankins, PT [facility]

## 2020-01-30 ENCOUNTER — HOSPITAL ENCOUNTER (OUTPATIENT)
Dept: PHYSICAL THERAPY | Age: 50
Discharge: HOME | End: 2020-01-30
Payer: COMMERCIAL

## 2020-01-30 PROCEDURE — 97012 MECHANICAL TRACTION THERAPY: CPT

## 2020-01-30 PROCEDURE — 97140 MANUAL THERAPY 1/> REGIONS: CPT

## 2020-01-30 PROCEDURE — 97110 THERAPEUTIC EXERCISES: CPT

## 2020-01-30 PROCEDURE — 97014 ELECTRIC STIMULATION THERAPY: CPT

## 2020-01-30 NOTE — PROGRESS NOTES
Luzmaria Nails  : 1970  Payor: Darryle Blander / Plan: [facility] / Product Type: PPO /  [address]  at [place] [address] Therapy  [address], [address]  [phone]   [phone]      OUTPATIENT PHYSICAL THERAPY: Daily Treatment Note 2020  Visit Count:  8    ICD-10: Treatment Diagnosis: cervicalgia M54.2, cervical disorder with radiculopathy M50.12, muscle spasm of neck M62.838    Pre-treatment Symptoms/Complaints:  Patient reports she has had some neck soreness over the past few days. Pain: Initial: Pain Intensity 1: 4  Post Session:  3/10   Medications Last Reviewed:  2020  Updated Objective Findings:  None Today   TREATMENT:   THERAPEUTIC EXERCISE: (10 minutes):  Exercises per grid below to improve mobility. Required minimal verbal cues to promote proper body mechanics. Progressed resistance, range and repetitions as indicated. Scap retract blue TB 2 x 12  Shoulder pull downs green TB 2 x 12  Cervical retraction with rotation x 10-held  Nu-step x 7 min-held  UBE x 6 min  Cervical retraction with shoulder flexion 3# x 20  Manual Therapy ( 15 min    ): manual traction and shoulder depression stretching. Pt in supine  Manual cervical retraction/extension mobs 2 x 10. Light STM and shoulder depression in sideyling with scap mobs to work on decreasing tension around the scapula  Therapeutic Modalities (    35 min ):cervical traction x 20 minutes at 20# for improved pain control followed by heat and 2 channel premod e-stim to left cervical and shoulder region x 15 min. Pt checked on for comfort    HEP: na    Treatment/Session Summary:    · Response to Treatment:  Patient reporting no increased pain with treatment. She is now off of prednisone. She has had some mild cervical soreness and mild arm tingling at times. Tip of left index finger is still numb.   She is very complaint and is progressing well thus far with managing herniated disc symptoms. Attempting to progress her with strengthening of upper arms and cervical region.   ·  Communication/Consultation:  None today  · Equipment provided today:  None today  · Recommendations/Intent for next treatment session: Next visit will focus on traction, e-stim, manual therapy, light stretching and strengthening  Treatment Plan of Care Effective Dates:  1/8/2020 to 3/8/2020  Total Treatment Billable Duration:  60 min  PT Patient Time In/Time Out  Time In: 0115  Time Out: [address], PT    Future Appointments   Date Time Provider Ayo Maldonado   2/4/2020 11:00 AM [place], PT [facility]   2/7/2020 11:00 AM [place], PT [facility]   2/11/2020 11:15 AM [place]ka [place], PT [facility]   2/14/2020 11:15 AM [place], PT [facility]

## 2020-01-31 NOTE — PROGRESS NOTES
Gwen Licona  : 1970  Payor: Jenny Tabares / Plan: [facility] / Product Type: PPO /  [address]  at [place] [address] Therapy  [address], [address]  [phone]   [phone]         OUTPATIENT PHYSICAL THERAPY:Progress Report 2020   ICD-10: Treatment Diagnosis: cervicalgia M54.2, cervical disorder with radiculopathy M50.12, muscle spasm of neck M62.838  Precautions/Allergies:   Patient has no known allergies. TREATMENT PLAN:  Effective Dates: 2020 TO 3/8/2020 (60 days). Frequency/Duration: 2 times a week for 60 Day(s) and upon reassessment, will adjust frequency and duration as progress indicates. MEDICAL/REFERRING DIAGNOSIS:  Cervicalgia [M54.2]   DATE OF ONSET: before [place]  REFERRING PHYSICIAN: Daysi Majano MD MD Orders: Eval and treat  Return MD Appointment: as needed     INITIAL ASSESSMENT:  Ms. Venancio Hull presents with severe left sided cervical, shoulder blade and L UE pain due to effects of DDD with disc bulging and cord contact from roughly C3-C7. She reported feeling this way a week before [place] and her pain has continued since then. She has had prior cervical pain, but never this severe. She reported helping her son lift her garage door and then she worked heavily in her bathroom breaking tile and carrying tile. She thinks these two activities may have exacerbated her pain levels. She has undergone MRI and is being referred to Dr. Rodney Lawrence next Monday. She has not been able to wash her hair, back or perform housework or her daily job. Assessment as of 2020:  Pt now reporting 4-5/10 pain rating and is experiencing mild cervical soreness. She does still report tip of left index  Finger is numb and she does have some intermittent tingling in left arm. She has been able to return to work full time and is performing cooking and cleaning of her house.   She is avoiding any heavy lifting and any bathroom [employer] work at this time. She visibly appears much more comfortable and is able to turn head now fully to the right and the left. She will continue to benefit from therapy to work on decreasing radicular symptoms and overall cervical pain. We will also work on UE strengthening. PROBLEM LIST (Impacting functional limitations):  1. Decreased Strength  2. Decreased ADL/Functional Activities  3. Increased Pain  4. Decreased Flexibility/Joint Mobility INTERVENTIONS PLANNED: (Treatment may consist of any combination of the following)  1. Electrical Stimulation  2. Heat  3. Home Exercise Program (HEP)  4. Manual Therapy  5. Range of Motion (ROM)  6. Therapeutic Exercise/Strengthening  7. Transcutaneous Electrical Nerve Stimulation (TENS)  8. Ultrasound (US)  9. cervical traction     GOALS: (Goals have been discussed and agreed upon with patient.)  Short-Term Functional Goals: Time Frame: 4 weeks  1. Establish independent HEP with no cuing.-met  2. Pt will be able to wash her hair and back. -met  3. Pt will be able to return to work.-met  Discharge Goals: Time Frame: 8 weeks  1. Pt will be able to improve score on NDI by at least 5 points for advanced ADL's.-met  2. Pt will be able to improve cervical ROM by at least 10 degrees for improved neck rotation for driving.-met for rotation  3. Pt will be able to decrease pain to at least 3/10 with daily tasks.-in progress  4. Pt will be able to resume housework and cooking.-met    OUTCOME MEASURE:   Tool Used: Neck Disability Index (NDI)  Score:  Initial: 36/50  Most Recent: 5/50 (Date: 1/30/2020 )   Interpretation of Score: The Neck Disability Index is a revised form of the Oswestry Low Back Pain Index and is designed to measure the activities of daily living in person's with neck pain. Each section is scored on a 0-5 scale, 5 representing the greatest disability. The scores of each section are added together for a total score of 50.        MEDICAL NECESSITY:   · Patient is expected to demonstrate progress in strength and range of motion to improve pain levels and allow pt to return to her normal ADL's and to return to lifting of moderate to heavy objects without difficulty. .  REASON FOR SERVICES/OTHER COMMENTS:  · Patient continues to require skilled intervention due to severe pain limiting her ability to sleep, perform her job and perform her normal housework. Total Duration:  PT Patient Time In/Time Out  Time In: 0115  Time Out: 3696    Rehabilitation Potential For Stated Goals: Good  Regarding [facility] therapy, I certify that the treatment plan above will be carried out by a therapist or under their direction.   Thank you for this referral,  Kamlesh Bee, PT

## 2020-02-04 ENCOUNTER — HOSPITAL ENCOUNTER (OUTPATIENT)
Dept: PHYSICAL THERAPY | Age: 50
Discharge: HOME | End: 2020-02-04
Payer: COMMERCIAL

## 2020-02-04 PROCEDURE — 97140 MANUAL THERAPY 1/> REGIONS: CPT

## 2020-02-04 PROCEDURE — 97110 THERAPEUTIC EXERCISES: CPT

## 2020-02-04 PROCEDURE — 97012 MECHANICAL TRACTION THERAPY: CPT

## 2020-02-04 NOTE — PROGRESS NOTES
Collette Amaya  : 1970  Payor: Nuris Hernandez / Plan: [facility] / Product Type: PPO /  [address]  at [place] [address] Therapy  [address], [address]  [phone]   [phone]      OUTPATIENT PHYSICAL THERAPY: Daily Treatment Note 2020  Visit Count:  9    ICD-10: Treatment Diagnosis: cervicalgia M54.2, cervical disorder with radiculopathy M50.12, muscle spasm of neck M62.838    Pre-treatment Symptoms/Complaints:  Patient reports the numbness in her index finger is now gone. Pain: Initial: Pain Intensity 1: 3  Post Session:  2/10   Medications Last Reviewed:  2020  Updated Objective Findings:  None Today   TREATMENT:   THERAPEUTIC EXERCISE: (25 minutes):  Exercises per grid below to improve mobility. Required minimal verbal cues to promote proper body mechanics. Progressed resistance, range and repetitions as indicated. Scap retract blue TB 2 x 12  Shoulder pull downs green TB 2 x 12  Cervical retraction with rotation x 10-held  Nu-step x 7 min-held  UBE x 6 min  Cervical retraction with shoulder flexion 3# x 20  Rows 10# bilaterally x 12  Manual Therapy ( 13 min    ): manual traction and shoulder depression stretching. Pt in supine  Manual cervical retraction/extension mobs 2 x 10. Light STM and shoulder depression in sideyling with scap mobs to work on decreasing tension around the scapula  Therapeutic Modalities (    20 min ):cervical traction x 20 minutes at 20# for improved pain control followed. Pt checked on for comfort during course of treatment    HEP: na    Treatment/Session Summary:    · Response to Treatment:  Patient reporting continued progress in her pain levels. She no longer feels index finger numbness. She has changed her working environment and desk to allow her more versatility and to avoid any strain on her neck or UE's. She will gradually be progressed with weights and resisted UE exercises.   She continues to respond well to traction.   ·  Communication/Consultation:  None today  · Equipment provided today:  None today  · Recommendations/Intent for next treatment session: Next visit will focus on traction, e-stim, manual therapy, light stretching and strengthening  Treatment Plan of Care Effective Dates:  1/8/2020 to 3/8/2020  Total Treatment Billable Duration:  58 min  PT Patient Time In/Time Out  Time In: 1100  Time Out: [address] Lee, PT    Future Appointments   Date Time Provider Ayo Maldonado   2/7/2020 11:00 AM Shakira Stratton, PT [facility]   2/11/2020 11:15 AM Shakira Stratton, PT [facility]   2/14/2020 11:15 AM Shakira Stratton, PT [facility]

## 2020-02-07 ENCOUNTER — APPOINTMENT (OUTPATIENT)
Dept: PHYSICAL THERAPY | Age: 50
End: 2020-02-07
Payer: COMMERCIAL

## 2020-02-11 ENCOUNTER — HOSPITAL ENCOUNTER (OUTPATIENT)
Dept: PHYSICAL THERAPY | Age: 50
Discharge: HOME | End: 2020-02-11
Payer: COMMERCIAL

## 2020-02-11 PROCEDURE — 97110 THERAPEUTIC EXERCISES: CPT

## 2020-02-11 PROCEDURE — 97012 MECHANICAL TRACTION THERAPY: CPT

## 2020-02-11 PROCEDURE — 97140 MANUAL THERAPY 1/> REGIONS: CPT

## 2020-02-11 NOTE — PROGRESS NOTES
Austin Vaughn  : 1970  Payor: Rachid Tello / Plan: [facility] / Product Type: PPO /  [address]  at [address]  [address], [address]  [phone]   [phone]      OUTPATIENT PHYSICAL THERAPY: Daily Treatment Note 2020  Visit Count:  10    ICD-10: Treatment Diagnosis: cervicalgia M54.2, cervical disorder with radiculopathy M50.12, muscle spasm of neck M62.838    Pre-treatment Symptoms/Complaints:  Patient reports she has had very little pain. Pain: Initial: Pain Intensity 1: 2  Post Session:  2/10   Medications Last Reviewed:  2020  Updated Objective Findings:  None Today   TREATMENT:   THERAPEUTIC EXERCISE: (25 minutes):  Exercises per grid below to improve mobility. Required minimal verbal cues to promote proper body mechanics. Progressed resistance, range and repetitions as indicated. Scap retract blue TB 2 x 12  Shoulder pull downs green TB 2 x 12  Nu-step x 7 min-held  UBE x 6 min  Cervical retraction with shoulder flexion 3# x 20  Rows 10# bilaterally 2 x 12  Overhead press 3# 2 x 15  Hor abd/add green TB x 20  Manual Therapy ( 13 min    ): manual traction and shoulder depression stretching. Pt in supine  Manual cervical retraction/extension mobs 2 x 10. Light STM and shoulder depression in sideyling with scap mobs to work on decreasing tension around the scapula  Therapeutic Modalities (    20 min ):cervical traction x 20 minutes at 22# for improved pain control followed. Pt checked on for comfort during course of treatment    HEP: na    Treatment/Session Summary:    · Response to Treatment:  Patient reporting feeling continued improvement. She was able to progress to lifting light weights over her head today. She has had very little pain and index finger numbness continues to have resolved. She is very pleased with her progress and will see MD next Monday.   ·  Communication/Consultation:  None today  · Equipment provided today:  None today  · Recommendations/Intent for next treatment session: Next visit will focus on traction, e-stim, manual therapy, light stretching and strengthening  Treatment Plan of Care Effective Dates:  1/8/2020 to 3/8/2020  Total Treatment Billable Duration:  58 min  PT Patient Time In/Time Out  Time In: 1115  Time Out: [address] , PT    Future Appointments   Date Time Provider Ayo Maldonado   2/14/2020 11:15 AM Constantino Hankins PT [facility]

## 2020-02-14 ENCOUNTER — HOSPITAL ENCOUNTER (OUTPATIENT)
Dept: PHYSICAL THERAPY | Age: 50
Discharge: HOME | End: 2020-02-14
Payer: COMMERCIAL

## 2020-02-14 PROCEDURE — 97012 MECHANICAL TRACTION THERAPY: CPT

## 2020-02-14 PROCEDURE — 97140 MANUAL THERAPY 1/> REGIONS: CPT

## 2020-02-14 PROCEDURE — 97110 THERAPEUTIC EXERCISES: CPT

## 2020-02-14 NOTE — PROGRESS NOTES
Philip Martell  : 1970  Payor: Viraj Davey / Plan: [facility] / Product Type: PPO /  [address]  at [facility]  [address], [address]  [phone]   [phone]      OUTPATIENT PHYSICAL THERAPY: Daily Treatment Note 2020  Visit Count:  11    ICD-10: Treatment Diagnosis: cervicalgia M54.2, cervical disorder with radiculopathy M50.12, muscle spasm of neck M62.838    Pre-treatment Symptoms/Complaints:  Patient reports she has had no pain. Pain: Initial: Pain Intensity 1: 0  Post Session:  0/10   Medications Last Reviewed:  2020  Updated Objective Findings:  None Today   TREATMENT:   THERAPEUTIC EXERCISE: (23 minutes):  Exercises per grid below to improve mobility. Required minimal verbal cues to promote proper body mechanics. Progressed resistance, range and repetitions as indicated. Scap retract blue TB 2 x 12  Shoulder pull downs blue TB 2 x 12  Nu-step x 7 min-held  UBE x 6 min  Cervical retraction with shoulder flexion 4# x 20  Rows 10# bilaterally 2 x 12  Overhead press 3# 2 x 15  Hor abd/add green TB x 20  Manual Therapy ( 11 min    ): manual traction and shoulder depression stretching. Pt in supine  Manual cervical retraction/extension mobs 2 x 10. Light STM and shoulder depression in sideyling with scap mobs to work on decreasing tension around the scapula  Therapeutic Modalities (    20 min ):cervical traction x 20 minutes at 22# for improved pain control followed. Pt checked on for comfort during course of treatment    HEP: na    Treatment/Session Summary:    · Response to Treatment:  Patient reporting having no increased pain. She has been able to work and sleep without symptoms. She has no radicular symptoms currently. She will be seeing MD on Monday and will discuss home traction unit and her overall progress.  Communication/Consultation:  None today  · Equipment provided today:  None today  · Recommendations/Intent for next treatment session: Next visit will focus on traction, e-stim, manual therapy, light stretching and strengthening  Treatment Plan of Care Effective Dates:  1/8/2020 to 3/8/2020  Total Treatment Billable Duration:  54 min  PT Patient Time In/Time Out  Time In: 1115  Time Out: [address] [facility], PT    No future appointments.

## 2020-05-14 NOTE — THERAPY DISCHARGE
Jose David Panchal  : 1970  Payor: Gerson Ron / Plan: [facility] / Product Type: PPO /  [address]  at [place] [address] Therapy  [address], [address]  [phone]   [phone]         OUTPATIENT PHYSICAL THERAPY:Discontinuation Summary 2020   ICD-10: Treatment Diagnosis: cervicalgia M54.2, cervical disorder with radiculopathy M50.12, muscle spasm of neck M62.838  Precautions/Allergies:   Patient has no known allergies. TREATMENT PLAN:  Effective Dates: 2020 TO 3/8/2020 (60 days). Frequency/Duration: 2 times a week for 60 Day(s) and upon reassessment, will adjust frequency and duration as progress indicates. MEDICAL/REFERRING DIAGNOSIS:  Cervicalgia [M54.2]   DATE OF ONSET: before [place]  REFERRING PHYSICIAN: Rogers Rizzo MD MD Orders: Eval and treat  Return MD Appointment: as needed     INITIAL ASSESSMENT:  Ms. Shlomo Owen presents with severe left sided cervical, shoulder blade and L UE pain due to effects of DDD with disc bulging and cord contact from roughly C3-C7. She reported feeling this way a week before [place] and her pain has continued since then. She has had prior cervical pain, but never this severe. She reported helping her son lift her garage door and then she worked heavily in her bathroom breaking tile and carrying tile. She thinks these two activities may have exacerbated her pain levels. She has undergone MRI and is being referred to Dr. Andrade Gracia next Monday. She has not been able to wash her hair, back or perform housework or her daily job. Assessment as of 2020:  Pt now reporting 4-5/10 pain rating and is experiencing mild cervical soreness. She does still report tip of left index  Finger is numb and she does have some intermittent tingling in left arm. She has been able to return to work full time and is performing cooking and cleaning of her house.   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 She visibly appears much more comfortable and is able to turn head now fully to the right and the left. She will continue to benefit from therapy to work on decreasing radicular symptoms and overall cervical pain. We will also work on UE strengthening. Discontinuation Summary 5/14/2020:  Lily Gutierrez was  seen in physical therapy from 1/8/2020 to 2/14/2020 for 11 visits. Treatment has been discontinued at this time due to patient improvement. At patient's last improvement she was primarily pain-free with full cervical motion and ability to return to work. We left her chart open in case she needed to return. She has not and her chart has been discontinued. The below goals were met prior to discontinuation. Thank you for this referral.        PROBLEM LIST (Impacting functional limitations):  1. Decreased Strength  2. Decreased ADL/Functional Activities  3. Increased Pain  4. Decreased Flexibility/Joint Mobility INTERVENTIONS PLANNED: (Treatment may consist of any combination of the following)  1. Electrical Stimulation  2. Heat  3. Home Exercise Program (HEP)  4. Manual Therapy  5. Range of Motion (ROM)  6. Therapeutic Exercise/Strengthening  7. Transcutaneous Electrical Nerve Stimulation (TENS)  8. Ultrasound (US)  9. cervical traction     GOALS: (Goals have been discussed and agreed upon with patient.)  Short-Term Functional Goals: Time Frame: 4 weeks  1. Establish independent HEP with no cuing.-met  2. Pt will be able to wash her hair and back. -met  3. Pt will be able to return to work.-met  Discharge Goals: Time Frame: 8 weeks  1. Pt will be able to improve score on NDI by at least 5 points for advanced ADL's.-met  2. Pt will be able to improve cervical ROM by at least 10 degrees for improved neck rotation for driving.-met  3. Pt will be able to decrease pain to at least 3/10 with daily tasks. -met  4.  Pt will be able to resume housework and cooking.-met    OUTCOME MEASURE:   Tool Used: Neck Disability Index (NDI)  Score:  Initial: 36/50  Most Recent: 5/50 (Date: 1/30/2020 )   Interpretation of Score: The Neck Disability Index is a revised form of the Oswestry Low Back Pain Index and is designed to measure the activities of daily living in person's with neck pain. Each section is scored on a 0-5 scale, 5 representing the greatest disability. The scores of each section are added together for a total score of 50. Rehabilitation Potential For Stated Goals: Good  Regarding [facility] therapy, I certify that the treatment plan above will be carried out by a therapist or under their direction.   Thank you for this referral,  Miguel Bueno, PT

## 2020-06-27 ENCOUNTER — HOSPITAL ENCOUNTER (OUTPATIENT)
Dept: MAMMOGRAPHY | Age: 50
Discharge: HOME | End: 2020-06-27
Attending: FAMILY MEDICINE
Payer: COMMERCIAL

## 2020-06-27 DIAGNOSIS — Z12.31 VISIT FOR SCREENING MAMMOGRAM: ICD-10-CM

## 2020-06-27 PROCEDURE — 77063 BREAST TOMOSYNTHESIS BI: CPT

## 2020-07-02 ENCOUNTER — HOSPITAL ENCOUNTER (OUTPATIENT)
Dept: MAMMOGRAPHY | Age: 50
Discharge: HOME | End: 2020-07-02
Attending: FAMILY MEDICINE
Payer: COMMERCIAL

## 2020-07-02 DIAGNOSIS — R92.8 ABNORMAL SCREENING MAMMOGRAM: ICD-10-CM

## 2020-07-02 PROCEDURE — 76642 ULTRASOUND BREAST LIMITED: CPT

## 2020-07-02 PROCEDURE — 77065 DX MAMMO INCL CAD UNI: CPT

## 2020-08-11 ENCOUNTER — HOSPITAL ENCOUNTER (OUTPATIENT)
Dept: SURGERY | Age: 50
Discharge: HOME | End: 2020-08-11

## 2020-08-11 ENCOUNTER — ANESTHESIA EVENT (OUTPATIENT)
Dept: SURGERY | Age: 50
End: 2020-08-11
Payer: COMMERCIAL

## 2020-08-11 VITALS — BODY MASS INDEX: 36.54 KG/M2 | WEIGHT: 214 LBS | HEIGHT: 64 IN

## 2020-08-11 NOTE — PERIOP NOTES
Patient verified name and . Order for consent not found in EHR. Type 2 surgery, PAT phone assessment complete. Orders not received. Labs per surgeon: no orders received. Labs per anesthesia protocol: Hgb 12.4 on 20 found in care everywhere and within anesthesia guidelines. Patient answered medical/surgical history questions at their best of ability. All prior to admission medications documented in [facility]. Patient instructed to take the following medications the day of surgery according to anesthesia guidelines with a small sip of water: prozac. Hold all vitamins 7 days prior to surgery and NSAIDS 5 days prior to surgery. Patient instructed on the following:    >Pt states she had a Covid test done on 20 but not within the last 10 days. Roxanne Mead informed of last Covid test and date. > 1 visitor allowed at this time. >Arrive at [facility], time of arrival to be called the day before by 1700  >NPO after midnight including gum, mints, and ice chips  >Responsible adult must drive patient to the hospital, stay during surgery, and patient will need supervision 24 hours after anesthesia  >Use antibacterial soap in shower the night before surgery and on the morning of surgery  >All piercings must be removed prior to arrival.    >Leave all valuables (money and jewelry) at home but bring insurance card and ID on  DOS. >Do not wear make-up, nail polish, lotions, cologne, perfumes, powders, or oil on skin.

## 2020-08-11 NOTE — H&P (VIEW-ONLY)
aDate: 2020 Name: Shaji Pineda MRN: [id number] : 1970 Age: 45 y.o. Sex: female Grover Dale MD  
 
 
CC:   
Chief Complaint Patient presents with  New Patient Gallbladder HPI: 
 
 Shaji Pineda is a 45 y.o. female who presents for evaluation of gallbladder problems as a referral from Dr. Dania Jackson. The patient was seen in the ED at [facility] on 20. She had an ultrasound done on 20 which showed cholelithiasis. She has had one week of RUQ pain, nausea, pain referred to her back, intermittent vomiting and diarrhea. She met a surgeon at [facility], but did not like him, asking Dr. Dania Jackson for a referral.  
 
PMH: 
 
Past Medical History:  
Diagnosis Date  Anxiety   
 managed with meds  Depression   
 managed with meds  Fever blister  Insomnia PSH: 
 
Past Surgical History:  
Procedure Laterality Date  HX  SECTION    
 HX HYSTERECTOMY    
 retains both ovaries MEDS: 
 
Current Outpatient Medications Medication Sig  FLUoxetine (PROzac) 20 mg capsule Take 20 mg by mouth daily.  LORazepam (ATIVAN) 1 mg tablet 1 po bid (Patient taking differently: Take 1 mg by mouth nightly. 1 po bid  Indications: difficulty sleeping)  valACYclovir (VALTREX) 1 gram tablet Take 2 Tabs by mouth two (2) times a day. X 1 day at onset fever blister   Indications: HERPES LABIALIS (Patient taking differently: Take 2,000 mg by mouth two (2) times daily as needed. X 1 day at onset fever blister   Indications: a cold sore) No current facility-administered medications for this visit. ALLERGIES:   
 
Allergies Allergen Reactions  Hydrocodone Itching  Morphine Itching SH: Social History Tobacco Use  Smoking status: Former Smoker Packs/day: 1.00 Years: 17.00 Pack years: 17.00 Last attempt to quit: 2006 Years since quittin.6  Smokeless tobacco: Never Used Substance Use Topics  Alcohol use: Yes Alcohol/week: 0.0 standard drinks Comment: occasionally  Drug use: No  
 
 
FH: 
 
Family History Problem Relation Age of Onset  Hypertension Mother  Breast Problems Mother   
     fibrocystic  Hypertension Father  Heart Disease Brother  Breast Cancer Neg Hx   
 
 
ROS: The patient has no difficulty with chest pain or shortness of breath. No fever or chills. Comprehensive 13 point review of systems was otherwise unremarkable except as noted above. Physical Exam:  
 
Visit Vitals BP (!) 145/96 Pulse 97 Ht 5' 4\" (1.626 m) Wt 212 lb 12.8 oz (96.5 kg) BMI 36.53 kg/m² General: Alert, oriented, cooperative white female in no acute distress. Eyes: Sclera are clear. Conjunctiva and lids within normal limits. No icterus. Ears and Nose: no gross deformities to visual inspection, gross hearing intact Neck: Supple, trachea midline, no appreciable thyromegaly Resp: Breathing is  non-labored. Lungs clear to auscultation without wheezing or rhonchi CV: RRR. No murmurs, rubs or gallops appreciated. Abd: soft, RUQ tenderness to palpation, active BS'S. Psych:  Mood and affect appropriate. Short-term memory and understanding intact Assessment/Plan:  Armond Fleischer is a 45 y.o. female who has signs and symptoms consistent with cholelithiasis/cholecystitis. 1. Laparoscopic, possible open, cholecystectomy. I went through the risks of bleeding, infection and anesthesia. I went through other risks of injury to the liver, biliary tree structures, stomach, small bowel, large bowel , pancreas and the potential need to convert to an open procedure.  
 
David Hairston MD 
  
 [facility]   8/11/2020  12:19 PM

## 2020-08-12 ENCOUNTER — ANESTHESIA (OUTPATIENT)
Dept: SURGERY | Age: 50
End: 2020-08-12
Payer: COMMERCIAL

## 2020-08-12 ENCOUNTER — HOSPITAL ENCOUNTER (OUTPATIENT)
Age: 50
Setting detail: OUTPATIENT SURGERY
Discharge: HOME | End: 2020-08-12
Attending: SURGERY | Admitting: SURGERY
Payer: COMMERCIAL

## 2020-08-12 VITALS
OXYGEN SATURATION: 94 % | HEIGHT: 64 IN | BODY MASS INDEX: 36.54 KG/M2 | HEART RATE: 102 BPM | RESPIRATION RATE: 16 BRPM | DIASTOLIC BLOOD PRESSURE: 63 MMHG | WEIGHT: 214 LBS | SYSTOLIC BLOOD PRESSURE: 125 MMHG | TEMPERATURE: 98.1 F

## 2020-08-12 DIAGNOSIS — K80.12 CALCULUS OF GALLBLADDER WITH ACUTE ON CHRONIC CHOLECYSTITIS WITHOUT OBSTRUCTION: Primary | ICD-10-CM

## 2020-08-12 PROCEDURE — 74011000250 HC RX REV CODE- 250: Performed by: NURSE ANESTHETIST, CERTIFIED REGISTERED

## 2020-08-12 PROCEDURE — 76210000006 HC OR PH I REC 0.5 TO 1 HR: Performed by: SURGERY

## 2020-08-12 PROCEDURE — 87635 SARS-COV-2 COVID-19 AMP PRB: CPT

## 2020-08-12 PROCEDURE — 77030039425 HC BLD LARYNG TRULITE DISP TELE -A: Performed by: ANESTHESIOLOGY

## 2020-08-12 PROCEDURE — 76060000035 HC ANESTHESIA 2 TO 2.5 HR: Performed by: SURGERY

## 2020-08-12 PROCEDURE — 77030021158 HC TRCR BLN GELPRT AMR -B: Performed by: SURGERY

## 2020-08-12 PROCEDURE — 77030020829: Performed by: SURGERY

## 2020-08-12 PROCEDURE — 77030031139 HC SUT VCRL2 J&J -A: Performed by: SURGERY

## 2020-08-12 PROCEDURE — 77030040922 HC BLNKT HYPOTHRM STRY -A: Performed by: ANESTHESIOLOGY

## 2020-08-12 PROCEDURE — 74011250636 HC RX REV CODE- 250/636: Performed by: ANESTHESIOLOGY

## 2020-08-12 PROCEDURE — 88304 TISSUE EXAM BY PATHOLOGIST: CPT

## 2020-08-12 PROCEDURE — 74011250636 HC RX REV CODE- 250/636: Performed by: NURSE ANESTHETIST, CERTIFIED REGISTERED

## 2020-08-12 PROCEDURE — 74011250636 HC RX REV CODE- 250/636: Performed by: SURGERY

## 2020-08-12 PROCEDURE — 74011000250 HC RX REV CODE- 250: Performed by: ANESTHESIOLOGY

## 2020-08-12 PROCEDURE — 77030009403 HC ELECTRD ENDO MEGA -B: Performed by: SURGERY

## 2020-08-12 PROCEDURE — 77030002933 HC SUT MCRYL J&J -A: Performed by: SURGERY

## 2020-08-12 PROCEDURE — 77030008522 HC TBNG INSUF LAPRO STRY -B: Performed by: SURGERY

## 2020-08-12 PROCEDURE — 77030007955 HC PCH ENDOSC SPEC J&J -B: Performed by: SURGERY

## 2020-08-12 PROCEDURE — 76210000020 HC REC RM PH II FIRST 0.5 HR: Performed by: SURGERY

## 2020-08-12 PROCEDURE — 77030018875 HC APPL CLP LIG4 J&J -B: Performed by: SURGERY

## 2020-08-12 PROCEDURE — 77030037088 HC TUBE ENDOTRACH ORAL NSL COVD-A: Performed by: ANESTHESIOLOGY

## 2020-08-12 PROCEDURE — 76010000162 HC OR TIME 1.5 TO 2 HR INTENSV-TIER 1: Performed by: SURGERY

## 2020-08-12 PROCEDURE — 77030002912 HC SUT ETHBND J&J -A: Performed by: SURGERY

## 2020-08-12 PROCEDURE — 77030010507 HC ADH SKN DERMBND J&J -B: Performed by: SURGERY

## 2020-08-12 PROCEDURE — 77030008606 HC TRCR ENDOSC KII AMR -B: Performed by: SURGERY

## 2020-08-12 PROCEDURE — 77030008518 HC TBNG INSUF ENDO STRY -B: Performed by: SURGERY

## 2020-08-12 PROCEDURE — 77030034154 HC SHR COAG HARM ACE J&J -F: Performed by: SURGERY

## 2020-08-12 PROCEDURE — 77030008608 HC TRCR ENDOSC SMTH AMR -B: Performed by: SURGERY

## 2020-08-12 PROCEDURE — 74011000250 HC RX REV CODE- 250: Performed by: SURGERY

## 2020-08-12 PROCEDURE — 77030008462 HC STPLR SKN PROX J&J -A: Performed by: SURGERY

## 2020-08-12 PROCEDURE — 77030008756 HC TU IRR SUC STRY -B: Performed by: SURGERY

## 2020-08-12 RX ORDER — SODIUM CHLORIDE, SODIUM LACTATE, POTASSIUM CHLORIDE, CALCIUM CHLORIDE 600; 310; 30; 20 MG/100ML; MG/100ML; MG/100ML; MG/100ML
100 INJECTION, SOLUTION INTRAVENOUS CONTINUOUS
Status: DISCONTINUED | OUTPATIENT
Start: 2020-08-12 | End: 2020-08-12 | Stop reason: HOSPADM

## 2020-08-12 RX ORDER — CEFAZOLIN SODIUM/WATER 2 G/20 ML
2 SYRINGE (ML) INTRAVENOUS
Status: COMPLETED | OUTPATIENT
Start: 2020-08-12 | End: 2020-08-12

## 2020-08-12 RX ORDER — BUPIVACAINE HYDROCHLORIDE AND EPINEPHRINE 5; 5 MG/ML; UG/ML
INJECTION, SOLUTION EPIDURAL; INTRACAUDAL; PERINEURAL AS NEEDED
Status: DISCONTINUED | OUTPATIENT
Start: 2020-08-12 | End: 2020-08-12 | Stop reason: HOSPADM

## 2020-08-12 RX ORDER — ONDANSETRON 2 MG/ML
INJECTION INTRAMUSCULAR; INTRAVENOUS AS NEEDED
Status: DISCONTINUED | OUTPATIENT
Start: 2020-08-12 | End: 2020-08-12 | Stop reason: HOSPADM

## 2020-08-12 RX ORDER — HYDROMORPHONE HYDROCHLORIDE 2 MG/ML
0.5 INJECTION, SOLUTION INTRAMUSCULAR; INTRAVENOUS; SUBCUTANEOUS
Status: DISCONTINUED | OUTPATIENT
Start: 2020-08-12 | End: 2020-08-12 | Stop reason: HOSPADM

## 2020-08-12 RX ORDER — EPHEDRINE SULFATE/0.9% NACL/PF 50 MG/5 ML
SYRINGE (ML) INTRAVENOUS AS NEEDED
Status: DISCONTINUED | OUTPATIENT
Start: 2020-08-12 | End: 2020-08-12 | Stop reason: HOSPADM

## 2020-08-12 RX ORDER — ROCURONIUM BROMIDE 10 MG/ML
INJECTION, SOLUTION INTRAVENOUS AS NEEDED
Status: DISCONTINUED | OUTPATIENT
Start: 2020-08-12 | End: 2020-08-12 | Stop reason: HOSPADM

## 2020-08-12 RX ORDER — FENTANYL CITRATE 50 UG/ML
100 INJECTION, SOLUTION INTRAMUSCULAR; INTRAVENOUS ONCE
Status: DISCONTINUED | OUTPATIENT
Start: 2020-08-12 | End: 2020-08-12 | Stop reason: HOSPADM

## 2020-08-12 RX ORDER — OXYCODONE AND ACETAMINOPHEN 5; 325 MG/1; MG/1
1-2 TABLET ORAL
Qty: 30 TAB | Refills: 0 | Status: SHIPPED | OUTPATIENT
Start: 2020-08-12 | End: 2020-08-15

## 2020-08-12 RX ORDER — AMOXICILLIN AND CLAVULANATE POTASSIUM 875; 125 MG/1; MG/1
1 TABLET, FILM COATED ORAL EVERY 12 HOURS
Qty: 14 TAB | Refills: 0 | Status: SHIPPED | OUTPATIENT
Start: 2020-08-12

## 2020-08-12 RX ORDER — NEOSTIGMINE METHYLSULFATE 1 MG/ML
INJECTION, SOLUTION INTRAVENOUS AS NEEDED
Status: DISCONTINUED | OUTPATIENT
Start: 2020-08-12 | End: 2020-08-12 | Stop reason: HOSPADM

## 2020-08-12 RX ORDER — LIDOCAINE HYDROCHLORIDE 10 MG/ML
0.1 INJECTION INFILTRATION; PERINEURAL AS NEEDED
Status: DISCONTINUED | OUTPATIENT
Start: 2020-08-12 | End: 2020-08-12 | Stop reason: HOSPADM

## 2020-08-12 RX ORDER — MIDAZOLAM HYDROCHLORIDE 1 MG/ML
2 INJECTION, SOLUTION INTRAMUSCULAR; INTRAVENOUS ONCE
Status: DISCONTINUED | OUTPATIENT
Start: 2020-08-12 | End: 2020-08-12 | Stop reason: HOSPADM

## 2020-08-12 RX ORDER — SUCCINYLCHOLINE CHLORIDE 20 MG/ML
INJECTION INTRAMUSCULAR; INTRAVENOUS AS NEEDED
Status: DISCONTINUED | OUTPATIENT
Start: 2020-08-12 | End: 2020-08-12 | Stop reason: HOSPADM

## 2020-08-12 RX ORDER — PROPOFOL 10 MG/ML
INJECTION, EMULSION INTRAVENOUS AS NEEDED
Status: DISCONTINUED | OUTPATIENT
Start: 2020-08-12 | End: 2020-08-12 | Stop reason: HOSPADM

## 2020-08-12 RX ORDER — LIDOCAINE HYDROCHLORIDE 20 MG/ML
INJECTION, SOLUTION EPIDURAL; INFILTRATION; INTRACAUDAL; PERINEURAL AS NEEDED
Status: DISCONTINUED | OUTPATIENT
Start: 2020-08-12 | End: 2020-08-12 | Stop reason: HOSPADM

## 2020-08-12 RX ORDER — FENTANYL CITRATE 50 UG/ML
INJECTION, SOLUTION INTRAMUSCULAR; INTRAVENOUS AS NEEDED
Status: DISCONTINUED | OUTPATIENT
Start: 2020-08-12 | End: 2020-08-12 | Stop reason: HOSPADM

## 2020-08-12 RX ORDER — DEXAMETHASONE SODIUM PHOSPHATE 4 MG/ML
INJECTION, SOLUTION INTRA-ARTICULAR; INTRALESIONAL; INTRAMUSCULAR; INTRAVENOUS; SOFT TISSUE AS NEEDED
Status: DISCONTINUED | OUTPATIENT
Start: 2020-08-12 | End: 2020-08-12 | Stop reason: HOSPADM

## 2020-08-12 RX ORDER — MIDAZOLAM HYDROCHLORIDE 1 MG/ML
2 INJECTION, SOLUTION INTRAMUSCULAR; INTRAVENOUS
Status: COMPLETED | OUTPATIENT
Start: 2020-08-12 | End: 2020-08-12

## 2020-08-12 RX ORDER — NALOXONE HYDROCHLORIDE 0.4 MG/ML
0.04 INJECTION, SOLUTION INTRAMUSCULAR; INTRAVENOUS; SUBCUTANEOUS
Status: DISCONTINUED | OUTPATIENT
Start: 2020-08-12 | End: 2020-08-12 | Stop reason: HOSPADM

## 2020-08-12 RX ORDER — GLYCOPYRROLATE 0.2 MG/ML
INJECTION INTRAMUSCULAR; INTRAVENOUS AS NEEDED
Status: DISCONTINUED | OUTPATIENT
Start: 2020-08-12 | End: 2020-08-12 | Stop reason: HOSPADM

## 2020-08-12 RX ADMIN — Medication 1 MG: at 16:46

## 2020-08-12 RX ADMIN — FENTANYL CITRATE 50 MCG: 50 INJECTION INTRAMUSCULAR; INTRAVENOUS at 15:25

## 2020-08-12 RX ADMIN — SODIUM CHLORIDE, SODIUM LACTATE, POTASSIUM CHLORIDE, AND CALCIUM CHLORIDE 100 ML/HR: 600; 310; 30; 20 INJECTION, SOLUTION INTRAVENOUS at 13:54

## 2020-08-12 RX ADMIN — GLYCOPYRROLATE 0.2 MG: 0.2 INJECTION, SOLUTION INTRAMUSCULAR; INTRAVENOUS at 16:46

## 2020-08-12 RX ADMIN — PROMETHAZINE HYDROCHLORIDE 3.12 MG: 25 INJECTION INTRAMUSCULAR; INTRAVENOUS at 17:14

## 2020-08-12 RX ADMIN — LIDOCAINE HYDROCHLORIDE 80 MG: 20 INJECTION, SOLUTION EPIDURAL; INFILTRATION; INTRACAUDAL; PERINEURAL at 15:12

## 2020-08-12 RX ADMIN — MIDAZOLAM 2 MG: 1 INJECTION INTRAMUSCULAR; INTRAVENOUS at 14:30

## 2020-08-12 RX ADMIN — FENTANYL CITRATE 50 MCG: 50 INJECTION INTRAMUSCULAR; INTRAVENOUS at 16:32

## 2020-08-12 RX ADMIN — DEXAMETHASONE SODIUM PHOSPHATE 10 MG: 4 INJECTION, SOLUTION INTRAMUSCULAR; INTRAVENOUS at 15:16

## 2020-08-12 RX ADMIN — SODIUM CHLORIDE, SODIUM LACTATE, POTASSIUM CHLORIDE, AND CALCIUM CHLORIDE: 600; 310; 30; 20 INJECTION, SOLUTION INTRAVENOUS at 17:09

## 2020-08-12 RX ADMIN — PROPOFOL 50 MG: 10 INJECTION, EMULSION INTRAVENOUS at 16:33

## 2020-08-12 RX ADMIN — FENTANYL CITRATE 100 MCG: 50 INJECTION INTRAMUSCULAR; INTRAVENOUS at 15:09

## 2020-08-12 RX ADMIN — PROMETHAZINE HYDROCHLORIDE 3.12 MG: 25 INJECTION INTRAMUSCULAR; INTRAVENOUS at 17:22

## 2020-08-12 RX ADMIN — PROPOFOL 170 MG: 10 INJECTION, EMULSION INTRAVENOUS at 15:12

## 2020-08-12 RX ADMIN — Medication 12.5 MG: at 15:37

## 2020-08-12 RX ADMIN — ONDANSETRON 4 MG: 2 INJECTION INTRAMUSCULAR; INTRAVENOUS at 15:30

## 2020-08-12 RX ADMIN — Medication 5 MG: at 15:34

## 2020-08-12 RX ADMIN — FENTANYL CITRATE 50 MCG: 50 INJECTION INTRAMUSCULAR; INTRAVENOUS at 15:50

## 2020-08-12 RX ADMIN — Medication 1 MG: at 16:44

## 2020-08-12 RX ADMIN — SUCCINYLCHOLINE CHLORIDE 140 MG: 20 INJECTION, SOLUTION INTRAMUSCULAR; INTRAVENOUS at 15:12

## 2020-08-12 RX ADMIN — Medication 2 G: at 15:18

## 2020-08-12 RX ADMIN — ROCURONIUM BROMIDE 20 MG: 10 INJECTION, SOLUTION INTRAVENOUS at 15:15

## 2020-08-12 RX ADMIN — LIDOCAINE HYDROCHLORIDE 0.1 ML: 10 INJECTION, SOLUTION INFILTRATION; PERINEURAL at 13:54

## 2020-08-12 RX ADMIN — PROPOFOL 30 MG: 10 INJECTION, EMULSION INTRAVENOUS at 15:49

## 2020-08-12 RX ADMIN — ROCURONIUM BROMIDE 10 MG: 10 INJECTION, SOLUTION INTRAVENOUS at 15:12

## 2020-08-12 RX ADMIN — GLYCOPYRROLATE 0.2 MG: 0.2 INJECTION, SOLUTION INTRAMUSCULAR; INTRAVENOUS at 16:44

## 2020-08-12 NOTE — ANESTHESIA PREPROCEDURE EVALUATION
Relevant Problems   No relevant active problems       Anesthetic History   No history of anesthetic complications            Review of Systems / Medical History  Patient summary reviewed and pertinent labs reviewed    Pulmonary              Pertinent negatives: Smoker: Former smoker.      Neuro/Psych         Psychiatric history (Depression and Anxiety)     Cardiovascular  Within defined limits                Exercise tolerance: >4 METS  Comments: Denies recent CP, SOB or Palpitations   GI/Hepatic/Renal  Within defined limits              Endo/Other        Obesity     Other Findings              Physical Exam    Airway  Mallampati: II  TM Distance: > 6 cm  Neck ROM: normal range of motion   Mouth opening: Normal     Cardiovascular  Regular rate and rhythm,  S1 and S2 normal,  no murmur, click, rub, or gallop             Dental  No notable dental hx       Pulmonary  Breath sounds clear to auscultation               Abdominal  GI exam deferred       Other Findings            Anesthetic Plan    ASA: 2  Anesthesia type: general          Induction: Intravenous  Anesthetic plan and risks discussed with: Patient

## 2020-08-12 NOTE — DISCHARGE INSTRUCTIONS
1. Diet as tolerated except for a  low fat diet after laparoscopic cholecystectomy. 2. Showering is allowed, but no tub baths, hot tubs or swimming. 3. Drainage is common from the wounds. Change the dressings as needed. Call our office if the wounds become reddened, tender, feel warm to the touch or pus starts to drain from the wound. 4. Take prescribed pain medication as directed, usually Percocet, Norco, Ultram or Dilaudid. Take over the counter medication for minor pain. 5. Ice may be applied intermittently to the surgical site or sites. 6. Call or office, [phone], if problems arise. 7. Follow up in the office at the assigned time. 8. Resume all medications as taken per surgery, unless specifically instructed not to take certain ones. 9. No lifting more than 25 pounds until told otherwise. 10. Driving is allowed 3 days after surgery as long as you feel comfortable enough to drive and have not taken any prescription pain medication prior to driving.

## 2020-08-12 NOTE — BRIEF OP NOTE
Brief Postoperative Note    Patient: Shyann Corbin  YOB: 1970  MRN: [id number]    Date of Procedure: 8/12/2020     Pre-Op Diagnosis: CHOLELITHIASIS/CHOLECYSTITIS    Post-Op Diagnosis: SAME WITH ACUTE CHOLECYSTITIS      Procedure(s): LAPAROSCOPIC CHOLECYSTECTOMY    Surgeon(s):   Miguelito Amado MD    Surgical Assistant: None    Anesthesia: General plus local    Estimated Blood Loss (mL): less than 773     Complications: None    Specimens:   ID Type Source Tests Collected by Time Destination   1 : Gallbladder Preservative Gallbladder  Miguelito Amado MD 8/12/2020 1542 Pathology        Implants: * No implants in log *    Drains: * No LDAs found *    Findings: See dictated note    Electronically Signed by Ashlie Johnson MD on 8/12/2020 at 4:50 PM

## 2020-08-12 NOTE — INTERVAL H&P NOTE
Update History & Physical 
 
The Patient's History and Physical of 8/11/20 was reviewed with the patient and I examined the patient. There was no change. The surgical site was confirmed by the patient and me. Plan:  The risk, benefits, expected outcome, and alternative to the recommended procedure have been discussed with the patient. Patient understands and wants to proceed with the procedure.  
 
Electronically signed by Natalie Moreno MD on 8/12/2020 at 1:45 PM

## 2020-08-12 NOTE — ANESTHESIA POSTPROCEDURE EVALUATION
Procedure(s):  CHOLECYSTECTOMY LAPAROSCOPIC.     general    Anesthesia Post Evaluation      Multimodal analgesia: multimodal analgesia used between 6 hours prior to anesthesia start to PACU discharge  Patient location during evaluation: PACU  Patient participation: complete - patient participated  Level of consciousness: awake  Pain management: adequate  Airway patency: patent  Anesthetic complications: no  Cardiovascular status: acceptable  Respiratory status: spontaneous ventilation and acceptable  Hydration status: acceptable  Post anesthesia nausea and vomiting:  none      INITIAL Post-op Vital signs:   Vitals Value Taken Time   /63 8/12/2020  6:00 PM   Temp 36.7 °C (98.1 °F) 8/12/2020  5:08 PM   Pulse 102 8/12/2020  6:00 PM   Resp 16 8/12/2020  6:00 PM   SpO2 94 % 8/12/2020  6:00 PM

## 2020-08-13 LAB
COVID-19 RAPID TEST, COVR: NOT DETECTED
SARS-COV-2, COV2NT: NOT DETECTED
SOURCE, COVRS: NORMAL

## 2020-08-13 NOTE — OP NOTES
[address] [address]  OPERATIVE REPORT    Name:  Jimy Last  MR#:  [id number]  :  1970  ACCOUNT #:  [de-identified]  DATE OF SERVICE:  2020    PREOPERATIVE DIAGNOSIS:  Cholelithiasis, cholecystitis. POSTOPERATIVE DIAGNOSES:  Cholelithiasis with acute cholecystitis. PROCEDURE PERFORMED:  Laparoscopic cholecystectomy. SURGEON:  Ann Keller. Frida Mejia MD    ASSISTANT:  None. ANESTHESIA:  General endotracheal anesthesia by Dr. Lina Renner. COMPLICATIONS:  None. SPECIMENS REMOVED:  Gallbladder with multiple stones. IMPLANTS:  None. ESTIMATED BLOOD LOSS:  50 mL. DRAINS:  None. HISTORY:  A 62-year-old female who I was asked to see by Dr. Ramon Aragon. The patient had gone to [facility] and was seen in the emergency department a week ago. She was then referred to a surgeon at [facility]. Unfortunately, she did not like the individual that she met and asked Dr. Killian Tenorio for another referral.  He referred them to my office. He called at 9:30. We had her in the office and seen by 10:30 and her surgery was scheduled for , the next day. I went through procedure, risks of bleeding, infection, anesthesia, injury to the liver, biliary tree structures, small bowel, large bowel, stomach, pancreas, potential need to convert to an open procedure. She was agreeable and signed consent form. PROCEDURE:  The patient was seen in the preop area with a male  and was then transported to room #4 [address] [place] [address].  She was placed on the operative table in the supine position where general endotracheal anesthesia was administered without complications. The patient had sequential compression devices placed. She received 2 g of Ancef as prophylactic antibiotic coverage. Her abdomen was prepped and draped in the usual sterile manner. I came in the room, did a time-out identifying the patient, surgeon, procedure, and her birth date of 1970.   When everyone in the room agreed with the time-out, I began the procedure. I made an incision on the inferior aspect of her umbilicus with a 15 scalpel blade and incised skin and soft tissue. The fascia was exposed and divided. I made an aperture in the fascia with a 15 scalpel blade. Then, I penetrated the peritoneal cavity with my index finger so as not to injure the underlying bowel. Two stay sutures of 0 Vicryl were placed and used to secure a Kalpana trocar that was used throughout the procedure. A 10 mm 30-degree scope was inserted under direct vision. Three 5 mm trocars were placed, one in the epigastric region, one in the right midclavicular line, and one in the right anterior axillary line. The tip was seen, rest of it was densely covered with omentum and pericolonic fat. We had to peel this off, all this was consistent with acute cholecystitis. The gallbladder was markedly distended, thick walled. The omentum was adherent to it. We were able to decompress the gallbladder of 75 mL of bile. It was mainly filled with very large stones. Once it was decompressed, I was able to grasp it. I had to add a 5 mm trocar in the midclavicular line at the level of the umbilicus to hold the transverse colon and omentum down as they kept getting in our way even despite being in rather steep reverse Trendelenburg position with the right side up. This is a very difficult procedure one of the harder gallbladders that I have ever removed in my 29-year career. I began dissection in the area of Calot's triangle. Everything was very fibrotic. There was lot of bleeding, lot of oozing from all the tissues. I could not isolate the cystic duct very well. I did find the cystic artery clipped it twice on the stay side, once on the specimen side and divided with a 5 mm harmonic scalpel.   I elected to do a dome-down approach at this point because I could not adequately isolate the cystic duct or the structures in Calot's triangle. We began our dissection at the fundus and took the gallbladder down. There was no plane between the posterior wall of the gallbladder and the liver. We kept getting into the liver. I had to use spatula and electrocautery. I came through it very slowly so as not to have significant bleeding. During this dissection, the gallbladder wall was torn and multiple stones were extruded. I very carefully emptied the gallbladder of all the stones. We changed the 5 mm trocar in the epigastric region to a 12 and we used an Endopouch. We used two Endopouchs during the procedure. The first one was to get the multiple stones, there was over 20 of them that I had taken out of the gallbladder. I placed an Endopouch in through the Kalpana trocar. A 10 mm 30-degree scope was used for visualization through the 12 mm trocar in the epigastric region. I placed 20 stones into the Endopouch and then withdrew it through the umbilical incision site. These were all sent with the gallbladder later as well as multiple other stones that were found. We then returned the camera to the Kalpana and began the dissection, still I was able to take it all the way down to the hilum. I still could not isolate the cystic duct. I basically came across the hilum of the gallbladder so as not to injure the common bile duct. I came across the hilum leaving a short tag of gallbladder in place. The remainder of the gallbladder was removed with an  Endopouch as well as the rest of the stones that I had collected. Over 30 stones were collected. I tried not to miss any of them. We then withdrew the gallbladder and the remaining stones. All stones in the gallbladder were sent to Pathology for evaluation. At this point, I asked for the laparoscopic needle quintero and I oversewed what I found to be the cystic duct with a figure-of-eight suture of 0 Ethibond doing laparoscopic knot tying technique. This closed the duct.   I then reinforced this by placing three 10 mm clips on this area to ensure that the cystic duct was closed. I checked the gallbladder fossa. We took our time. I took over an hour and 45 minutes to do this procedure. The blood loss was about 50 mL. I cauterized the gallbladder bed extensively. There was no bleeding or bile leak noted. The acute cholecystitis nature of this gallbladder made this extremely difficult and time consuming. At this point, I did not see anything else that was necessary. There was no bleeding or bile leak from the gallbladder fossa. No bile leak from the cystic duct stump that was oversewn and clipped and no bleeding from the cystic artery stump which still had two 5 mm clips on it. All the ports were removed under direct vision. No bleeding was noted from any of the sites. Kalpana trocar was removed. The fascia of the umbilicus was closed with interrupted sutures of 0 Vicryl. The skin was closed with surgical staples. The patient tolerated the procedure well, was extubated, brought to recovery room in stable condition, should be able to go home later tonight.       MD MIRIAN [id number]  D:  08/13/2020 8:40  T:  08/13/2020 18:20  JOB #:  [id number]

## 2021-06-30 ENCOUNTER — TRANSCRIBE ORDER (OUTPATIENT)
Dept: SCHEDULING | Age: 51
End: 2021-06-30

## 2021-06-30 DIAGNOSIS — Z12.31 VISIT FOR SCREENING MAMMOGRAM: Primary | ICD-10-CM

## 2021-07-16 ENCOUNTER — HOSPITAL ENCOUNTER (OUTPATIENT)
Dept: MAMMOGRAPHY | Age: 51
Discharge: HOME | End: 2021-07-16
Attending: FAMILY MEDICINE
Payer: COMMERCIAL

## 2021-07-16 DIAGNOSIS — Z12.31 VISIT FOR SCREENING MAMMOGRAM: ICD-10-CM

## 2021-07-16 PROCEDURE — 77063 BREAST TOMOSYNTHESIS BI: CPT

## 2022-02-07 ENCOUNTER — APPOINTMENT (RX ONLY)
Dept: URBAN - METROPOLITAN AREA CLINIC 330 | Facility: CLINIC | Age: 52
Setting detail: DERMATOLOGY
End: 2022-02-07

## 2022-02-07 DIAGNOSIS — L72.0 EPIDERMAL CYST: ICD-10-CM

## 2022-02-07 DIAGNOSIS — D17 BENIGN LIPOMATOUS NEOPLASM: ICD-10-CM

## 2022-02-07 DIAGNOSIS — D22 MELANOCYTIC NEVI: ICD-10-CM

## 2022-02-07 DIAGNOSIS — L81.4 OTHER MELANIN HYPERPIGMENTATION: ICD-10-CM

## 2022-02-07 DIAGNOSIS — D18.0 HEMANGIOMA: ICD-10-CM

## 2022-02-07 DIAGNOSIS — L82.1 OTHER SEBORRHEIC KERATOSIS: ICD-10-CM

## 2022-02-07 PROBLEM — D23.5 OTHER BENIGN NEOPLASM OF SKIN OF TRUNK: Status: ACTIVE | Noted: 2022-02-07

## 2022-02-07 PROBLEM — D17.21 BENIGN LIPOMATOUS NEOPLASM OF SKIN AND SUBCUTANEOUS TISSUE OF RIGHT ARM: Status: ACTIVE | Noted: 2022-02-07

## 2022-02-07 PROBLEM — D23.72 OTHER BENIGN NEOPLASM OF SKIN OF LEFT LOWER LIMB, INCLUDING HIP: Status: ACTIVE | Noted: 2022-02-07

## 2022-02-07 PROBLEM — D22.5 MELANOCYTIC NEVI OF TRUNK: Status: ACTIVE | Noted: 2022-02-07

## 2022-02-07 PROBLEM — D18.01 HEMANGIOMA OF SKIN AND SUBCUTANEOUS TISSUE: Status: ACTIVE | Noted: 2022-02-07

## 2022-02-07 PROBLEM — D22.72 MELANOCYTIC NEVI OF LEFT LOWER LIMB, INCLUDING HIP: Status: ACTIVE | Noted: 2022-02-07

## 2022-02-07 PROBLEM — D23.39 OTHER BENIGN NEOPLASM OF SKIN OF OTHER PARTS OF FACE: Status: ACTIVE | Noted: 2022-02-07

## 2022-02-07 PROBLEM — D22.62 MELANOCYTIC NEVI OF LEFT UPPER LIMB, INCLUDING SHOULDER: Status: ACTIVE | Noted: 2022-02-07

## 2022-02-07 PROBLEM — D23.111 OTHER BENIGN NEOPLASM OF SKIN OF RIGHT UPPER EYELID, INCLUDING CANTHUS: Status: ACTIVE | Noted: 2022-02-07

## 2022-02-07 PROCEDURE — ? ADDITIONAL NOTES

## 2022-02-07 PROCEDURE — ? DERMATOSCOPIC EVALUATION

## 2022-02-07 PROCEDURE — ? DEFER

## 2022-02-07 PROCEDURE — ? FULL BODY SKIN EXAM

## 2022-02-07 PROCEDURE — ? OBSERVATION

## 2022-02-07 PROCEDURE — 99203 OFFICE O/P NEW LOW 30 MIN: CPT

## 2022-02-07 PROCEDURE — ? COUNSELING

## 2022-02-07 ASSESSMENT — LOCATION SIMPLE DESCRIPTION DERM
LOCATION SIMPLE: RIGHT POSTERIOR UPPER ARM
LOCATION SIMPLE: RIGHT SHOULDER
LOCATION SIMPLE: ABDOMEN
LOCATION SIMPLE: RIGHT FOREHEAD
LOCATION SIMPLE: RIGHT LOWER BACK
LOCATION SIMPLE: LEFT FOREARM
LOCATION SIMPLE: POSTERIOR NECK
LOCATION SIMPLE: LEFT SHOULDER
LOCATION SIMPLE: RIGHT UPPER BACK
LOCATION SIMPLE: LEFT UPPER BACK
LOCATION SIMPLE: RIGHT UPPER BACK
LOCATION SIMPLE: UPPER BACK
LOCATION SIMPLE: LEFT POSTERIOR THIGH
LOCATION SIMPLE: LEFT THIGH
LOCATION SIMPLE: UPPER BACK
LOCATION SIMPLE: LEFT POSTERIOR UPPER ARM

## 2022-02-07 ASSESSMENT — LOCATION DETAILED DESCRIPTION DERM
LOCATION DETAILED: LEFT INFERIOR UPPER BACK
LOCATION DETAILED: LEFT SUPERIOR UPPER BACK
LOCATION DETAILED: LEFT ANTERIOR PROXIMAL THIGH
LOCATION DETAILED: LEFT DISTAL POSTERIOR THIGH
LOCATION DETAILED: RIGHT ANTERIOR SHOULDER
LOCATION DETAILED: INFERIOR THORACIC SPINE
LOCATION DETAILED: INFERIOR THORACIC SPINE
LOCATION DETAILED: RIGHT SUPERIOR MEDIAL UPPER BACK
LOCATION DETAILED: RIGHT LATERAL TRAPEZIAL NECK
LOCATION DETAILED: LEFT VENTRAL DISTAL FOREARM
LOCATION DETAILED: RIGHT MEDIAL UPPER BACK
LOCATION DETAILED: LEFT ANTERIOR SHOULDER
LOCATION DETAILED: RIGHT MID-UPPER BACK
LOCATION DETAILED: RIGHT SUPERIOR MEDIAL MIDBACK
LOCATION DETAILED: RIGHT FOREHEAD
LOCATION DETAILED: RIGHT MID-UPPER BACK
LOCATION DETAILED: LEFT DISTAL POSTERIOR UPPER ARM
LOCATION DETAILED: LEFT RIB CAGE
LOCATION DETAILED: SUBXIPHOID
LOCATION DETAILED: RIGHT DISTAL POSTERIOR UPPER ARM

## 2022-02-07 ASSESSMENT — LOCATION ZONE DERM
LOCATION ZONE: LEG
LOCATION ZONE: TRUNK
LOCATION ZONE: NECK
LOCATION ZONE: FACE
LOCATION ZONE: ARM
LOCATION ZONE: TRUNK

## 2022-02-07 NOTE — PROCEDURE: MIPS QUALITY
Quality 110: Preventive Care And Screening: Influenza Immunization: Influenza Immunization Administered during Influenza season
Quality 226: Preventive Care And Screening: Tobacco Use: Screening And Cessation Intervention: Patient screened for tobacco use and is an ex/non-smoker
Detail Level: Detailed
Quality 431: Preventive Care And Screening: Unhealthy Alcohol Use - Screening: Patient not identified as an unhealthy alcohol user when screened for unhealthy alcohol use using a systematic screening method
Quality 111:Pneumonia Vaccination Status For Older Adults: Pneumococcal Vaccination Previously Received

## 2022-02-07 NOTE — PROCEDURE: OBSERVATION
Patient/Caregiver provided printed discharge information.
Detail Level: Detailed
Size Of Lesion In Cm (Optional): 0

## 2022-03-19 PROBLEM — E66.01 SEVERE OBESITY (HCC): Status: ACTIVE | Noted: 2019-01-22

## 2022-06-16 ENCOUNTER — HOSPITAL ENCOUNTER (OUTPATIENT)
Dept: LAB | Age: 52
Discharge: HOME | End: 2022-06-19

## 2022-06-16 PROCEDURE — 88305 TISSUE EXAM BY PATHOLOGIST: CPT

## 2022-07-22 ENCOUNTER — HOSPITAL ENCOUNTER (OUTPATIENT)
Dept: MAMMOGRAPHY | Age: 52
Discharge: HOME | End: 2022-07-25
Payer: COMMERCIAL

## 2022-07-22 DIAGNOSIS — Z12.31 VISIT FOR SCREENING MAMMOGRAM: ICD-10-CM

## 2022-07-22 PROCEDURE — 77063 BREAST TOMOSYNTHESIS BI: CPT

## 2023-03-30 ENCOUNTER — OFFICE VISIT (OUTPATIENT)
Dept: UROGYNECOLOGY | Age: 53
End: 2023-03-30
Payer: COMMERCIAL

## 2023-03-30 VITALS — BODY MASS INDEX: 34.31 KG/M2 | WEIGHT: 201 LBS | HEIGHT: 64 IN

## 2023-03-30 DIAGNOSIS — M62.89 PELVIC FLOOR DYSFUNCTION: Primary | ICD-10-CM

## 2023-03-30 LAB
BILIRUBIN, URINE, POC: NEGATIVE
BLOOD URINE, POC: NEGATIVE
GLUCOSE URINE, POC: NEGATIVE
KETONES, URINE, POC: NEGATIVE
LEUKOCYTE ESTERASE, URINE, POC: NEGATIVE
NITRITE, URINE, POC: NEGATIVE
PH, URINE, POC: 5.5 (ref 4.6–8)
PROTEIN,URINE, POC: NEGATIVE
SPECIFIC GRAVITY, URINE, POC: >=1.03 (ref 1–1.03)
URINALYSIS CLARITY, POC: CLEAR
URINALYSIS COLOR, POC: YELLOW
UROBILINOGEN, POC: NORMAL

## 2023-03-30 PROCEDURE — G8484 FLU IMMUNIZE NO ADMIN: HCPCS | Performed by: OBSTETRICS & GYNECOLOGY

## 2023-03-30 PROCEDURE — 1036F TOBACCO NON-USER: CPT | Performed by: OBSTETRICS & GYNECOLOGY

## 2023-03-30 PROCEDURE — 81003 URINALYSIS AUTO W/O SCOPE: CPT | Performed by: OBSTETRICS & GYNECOLOGY

## 2023-03-30 PROCEDURE — 3017F COLORECTAL CA SCREEN DOC REV: CPT | Performed by: OBSTETRICS & GYNECOLOGY

## 2023-03-30 PROCEDURE — G8417 CALC BMI ABV UP PARAM F/U: HCPCS | Performed by: OBSTETRICS & GYNECOLOGY

## 2023-03-30 PROCEDURE — G8428 CUR MEDS NOT DOCUMENT: HCPCS | Performed by: OBSTETRICS & GYNECOLOGY

## 2023-03-30 PROCEDURE — 51701 INSERT BLADDER CATHETER: CPT | Performed by: OBSTETRICS & GYNECOLOGY

## 2023-03-30 PROCEDURE — 99204 OFFICE O/P NEW MOD 45 MIN: CPT | Performed by: OBSTETRICS & GYNECOLOGY

## 2023-03-30 NOTE — ASSESSMENT & PLAN NOTE
She has weak pelvic floor muscles. She has also been having trouble with BMs and they are hard to push out. I recommend PT. We discussed the purpose of physical therapy which is to strengthen the pelvic floor muscles and teach proper coordination of those muscles. I described the anatomy of those muscles involved and their relationship to the end-organs in the pelvis. I described therapy techniques which include a combination of therapeutic exercise, biofeedback, neuromuscular re-education, home programs, and electrical stimulation, as well as therapeutic massage and ultrasound for pain. She knows that we can always reeval for POP.

## 2023-03-30 NOTE — PROGRESS NOTES
[facility] UROGYNECOLOGY  [address]  Dept: [phone]        PCP:  Karen Stovall MD    3/30/2023        HPI:  I am being asked to see this patient in consultation by  No ref. provider found   for New Patient  . Ms. Uriel Garcia has been experiencing vaginal prolapse for about a month she was recently seen by Dr Claudia Jarvis. She was told that she have a mild case of Cystocele and rectocele  The prolapse does cause her pain and/or pressure. She does not have to splint to complete urination, a BM, or for comfort. Sitting and bowel movement makes her prolapse symptoms worse. Laying down makes her prolapse symptoms better. She has not tried a pessary, she has not tried physical therapy, she has not  had surgery for her POP. She is s/p a vaginal hysterectomy without removal of her ovaries. She voids 3-4 times during the day. She voids 1 times over night. She has 2-3 BM per week, and does not strain. She drinks 2-3 caffeine drinks beverages per day. She uses 0 artificial sweeteners per day. She drinks 0 alcoholic beverages per week. She has pelvic surgery in the past. She is s/p a vaginal hysterectomy without removal of her ovaries. Her last PAP: 2020    Her last Colonoscopy: 2022  Her last Mammogram: 2022    She does not have a history of DM. No results found for: LABA1C  No results found for: EAG    She does not have a history of sleep apnea. Tobacco: No    Sexual History: not sexually active.       Results for orders placed or performed in visit on 03/30/23   AMB POC URINALYSIS DIP STICK AUTO W/O MICRO   Result Value Ref Range    Color, Urine, POC Yellow     Clarity, Urine, POC Clear     Glucose, Urine, POC Negative Negative    Bilirubin, Urine, POC Negative Negative    Ketones, Urine, POC Negative Negative    Specific Gravity, Urine, POC >=1.030 1.001 - 1.035    Blood, Urine, POC Negative Negative    pH, Urine, POC 5.5 4.6 - 8.0    Protein, Urine,

## 2023-05-01 ENCOUNTER — HOSPITAL ENCOUNTER (OUTPATIENT)
Dept: PHYSICAL THERAPY | Age: 53
Setting detail: RECURRING SERIES
Discharge: HOME | End: 2023-05-04
Payer: COMMERCIAL

## 2023-05-01 PROCEDURE — 97530 THERAPEUTIC ACTIVITIES: CPT

## 2023-05-01 PROCEDURE — 97161 PT EVAL LOW COMPLEX 20 MIN: CPT

## 2023-05-01 ASSESSMENT — PAIN SCALES - GENERAL: PAINLEVEL_OUTOF10: 0

## 2023-05-10 ENCOUNTER — HOSPITAL ENCOUNTER (OUTPATIENT)
Dept: PHYSICAL THERAPY | Age: 53
Setting detail: RECURRING SERIES
End: 2023-05-10
Payer: COMMERCIAL

## 2023-06-06 ENCOUNTER — HOSPITAL ENCOUNTER (OUTPATIENT)
Dept: PHYSICAL THERAPY | Age: 53
Setting detail: RECURRING SERIES
Discharge: HOME | End: 2023-06-09
Payer: COMMERCIAL

## 2023-06-06 PROCEDURE — 97530 THERAPEUTIC ACTIVITIES: CPT

## 2023-06-06 PROCEDURE — 97110 THERAPEUTIC EXERCISES: CPT

## 2023-06-06 ASSESSMENT — PAIN SCALES - GENERAL: PAINLEVEL_OUTOF10: 0

## 2023-06-06 NOTE — PROGRESS NOTES
good understanding of plan of care, as well as prescribed home exercise program. All questions were answered to pts satisfaction. Pt was invited to call with any further questions or concerns. Patient utilizing glutes to assist with PFM contraction in seated and standing initially but able to quiet these muscles with verbal cues. Patient taken through urge suppression, body mechanics and the knack review. Communication/Consultation:  None today  Equipment provided today:  None  Recommendations/Intent for next treatment session: Next visit will focus on   1.  Re-assess coordination and the knack vaginally + assess for pain  2. TA activation, flexibility    >Total Treatment Billable Duration:  45 minutes  Time In: 0900  Time Out: 0946    Josue Hadley PT       Charge Capture  }Post Session Pain  PT Visit Info  [facility] Portal  MD Guidelines  Scanned Media  Benefits  MyChart    Future Appointments   Date Time Provider Deb Teague   6/13/2023  9:00 AM Josue Hadley, PT [facility]   6/20/2023  9:00 AM Josue Hadley PT [facility]   6/27/2023  9:00 AM Josue Hadley, PT [facility]   7/26/2023  8:45 AM Nirali Hoang DO [facility]

## 2023-06-20 ENCOUNTER — HOSPITAL ENCOUNTER (OUTPATIENT)
Dept: PHYSICAL THERAPY | Age: 53
Setting detail: RECURRING SERIES
Discharge: HOME | End: 2023-06-23
Payer: COMMERCIAL

## 2023-06-20 PROCEDURE — 97110 THERAPEUTIC EXERCISES: CPT

## 2023-06-20 PROCEDURE — 97140 MANUAL THERAPY 1/> REGIONS: CPT

## 2023-06-20 PROCEDURE — 97530 THERAPEUTIC ACTIVITIES: CPT

## 2023-06-20 ASSESSMENT — PAIN SCALES - GENERAL: PAINLEVEL_OUTOF10: 0

## 2023-06-20 NOTE — PROGRESS NOTES
Rebel Musa  : 1970  Primary:  (No info available)  Secondary:  [facility] [address] [address]  Phone: [phone]  Fax: [phone] Plan Frequency: 1x/week for 90 dyas    Plan of Care/Certification Expiration Date: 23      >PT Visit Info:  Plan Frequency: 1x/week for 90 dyas  Plan of Care/Certification Expiration Date: 23  Total # of Visits to Date: 4  Progress Note Due Date: 23  Canceled Appointment: 1      Visit Count:  4    OUTPATIENT PHYSICAL THERAPY:OP NOTE TYPE: Treatment Note 2023       Episode  }Appt Desk             Treatment Diagnosis:  Lack of coordination (muscle incoordination) (R27.8)  Pelvic floor dysfunction in female (M62.98)  Stress incontinence (female) (male) (N39.3)  Constipation, unspecified (K59.00)  Myalgia (M79.1)  Medical/Referring Diagnosis:  Pelvic floor dysfunction [M62.89]  Referring Physician:  Abilio Palomares DO MD Orders:  PT Eval and Treat   Date of Onset:  No data recorded   Allergies:   Hydrocodone and Morphine  Restrictions/Precautions:  Restrictions/Precautions: None  Required Braces or Orthoses?: No  No data recorded     Interventions Planned (Treatment may consist of any combination of the following):    Current Treatment Recommendations: Strengthening; ROM; Endurance training; Neuromuscular re-education; Manual; Pain management; Home exercise program; Patient/Caregiver education & training; Modalities; Therapeutic activities     >Subjective Comments: constipation (rectal pressure), myalgia, SIDDHARTHA  Patient reports that everything in her vagina feels swollen and uncomfortable like things are pushing out of it. Pressure and bulging. Patient reports that she has a weak vaginal floor. She reports feeling bulging in her vaginal canal especially with sitting a lot. She has urinary leakage with sneezing and coughing, jumping and orgasm.    Sitting makes it feel worse, standing makes it

## 2023-06-27 ENCOUNTER — HOSPITAL ENCOUNTER (OUTPATIENT)
Dept: PHYSICAL THERAPY | Age: 53
Setting detail: RECURRING SERIES
Discharge: HOME | End: 2023-06-30
Payer: COMMERCIAL

## 2023-06-27 PROCEDURE — 97110 THERAPEUTIC EXERCISES: CPT

## 2023-06-27 PROCEDURE — 97140 MANUAL THERAPY 1/> REGIONS: CPT

## 2023-06-27 ASSESSMENT — PAIN SCALES - GENERAL: PAINLEVEL_OUTOF10: 0

## 2023-07-05 ENCOUNTER — TRANSCRIBE ORDERS (OUTPATIENT)
Dept: SCHEDULING | Age: 53
End: 2023-07-05

## 2023-07-05 DIAGNOSIS — Z12.31 SCREENING MAMMOGRAM FOR HIGH-RISK PATIENT: Primary | ICD-10-CM

## 2023-07-18 ENCOUNTER — HOSPITAL ENCOUNTER (OUTPATIENT)
Dept: PHYSICAL THERAPY | Age: 53
Setting detail: RECURRING SERIES
Discharge: HOME | End: 2023-07-21
Payer: COMMERCIAL

## 2023-07-18 PROCEDURE — 97140 MANUAL THERAPY 1/> REGIONS: CPT

## 2023-07-18 PROCEDURE — 97110 THERAPEUTIC EXERCISES: CPT

## 2023-07-18 PROCEDURE — 97530 THERAPEUTIC ACTIVITIES: CPT

## 2023-07-18 ASSESSMENT — PAIN SCALES - GENERAL: PAINLEVEL_OUTOF10: 0

## 2023-07-18 NOTE — PROGRESS NOTES
classes in the area to help with accountability, constipation care 6/13/23 7/18/23     THERAPEUTIC EXERCISE: ( 15 minutes):    Exercises per grid below to improve mobility, strength, balance, and coordination. Required moderate visual, verbal, and tactile cues to promote proper body alignment, promote proper body posture, promote proper body mechanics, and promote proper body breathing techniques. Progressed resistance, range, repetitions, and complexity of movement as indicated. Date:  6/6/23 Date:  6/13/23 Date:   Date   6/27/23 Date:  7/18/23   Activity/Exercise Parameters Parameters Parameters parameters Parameters   Biofeedback:    PFM activation  Supine seated and standing     Qf's x 10       DB with PFM contraction x 10  With tactile cues :     Qfs x 10     DB with PFM contraction x 10     The knack x 2     Endurance hold x 3 seconds x 3  With tactile cues:     Contract-relax x 3  With tactile cues    Contract relax x 2 With tactile cues    Contract relax x 2   Ortho exam X 5 mins       Supine TA activation   X 15   X 10     Supine adduction with TA activation   X 20      Bridge   2x10     X 10 with adduction   2x10     TA extension supine   X 20 alt   X 20 alt     Clamshells   2x15 BTB      Quadruped shoulder taps     X 20 alt    Cat cow to emilee pose   X 5 mins X 5 minutes  X 5 minutes  X5 minutes   Figure 4   X 3 mins  X 3 minutes  X 3 mins    Double knees to chest   X 3 mins X 3 minutes  X 3 mins    V sit with physioball rollouts    X 3 mins  X 3 mins    Low lumbar rotations   X 3 minutes  X 3 mins    Single knee to chest      X 3 mins   Tail wags    X 5       MANUAL THERAPY: ( 25 minutes): Soft tissue mobilization was utilized and necessary because of the patient's painful spasm and restricted motion of soft tissue.    Date Type Location Comments   7/18/2023 Internal assessment/treatment    Sustained pressure to all 3 layers of PFM  Via vaginal canal completed   7/18/23 Adductor STM Supine

## 2023-08-12 ENCOUNTER — HOSPITAL ENCOUNTER (OUTPATIENT)
Dept: MAMMOGRAPHY | Age: 53
End: 2023-08-12
Payer: COMMERCIAL

## 2023-08-12 VITALS — BODY MASS INDEX: 31.58 KG/M2 | WEIGHT: 185 LBS | HEIGHT: 64 IN

## 2023-08-12 DIAGNOSIS — Z12.31 SCREENING MAMMOGRAM FOR HIGH-RISK PATIENT: ICD-10-CM

## 2023-08-12 PROCEDURE — 77063 BREAST TOMOSYNTHESIS BI: CPT

## 2024-08-24 ENCOUNTER — HOSPITAL ENCOUNTER (OUTPATIENT)
Dept: MAMMOGRAPHY | Age: 54
End: 2024-08-24
Payer: COMMERCIAL

## 2024-08-24 DIAGNOSIS — Z12.39 SCREENING BREAST EXAMINATION: ICD-10-CM

## 2024-08-24 PROCEDURE — 77063 BREAST TOMOSYNTHESIS BI: CPT

## 2025-08-30 ENCOUNTER — HOSPITAL ENCOUNTER (OUTPATIENT)
Dept: MAMMOGRAPHY | Age: 55
Discharge: HOME | End: 2025-09-02
Payer: COMMERCIAL

## 2025-08-30 DIAGNOSIS — Z12.31 ENCOUNTER FOR SCREENING MAMMOGRAM FOR BREAST CANCER: ICD-10-CM

## 2025-08-30 PROCEDURE — 77063 BREAST TOMOSYNTHESIS BI: CPT

## (undated) DEVICE — (D)PREP SKN CHLRAPRP APPL 26ML -- CONVERT TO ITEM 371833

## (undated) DEVICE — BUTTON SWITCH PENCIL BLADE ELECTRODE, HOLSTER: Brand: EDGE

## (undated) DEVICE — REM POLYHESIVE ADULT PATIENT RETURN ELECTRODE: Brand: VALLEYLAB

## (undated) DEVICE — SUTURE MCRYL SZ 4-0 L27IN ABSRB UD L19MM PS-2 1/2 CIR PRIM Y426H

## (undated) DEVICE — APPLIER CLP M L L11.4IN DIA10MM ENDOSCP ROT MULT FOR LIG

## (undated) DEVICE — TUBING INSUFFLATION SMK EVAC HI FLO SET PNEUMOCLEAR

## (undated) DEVICE — DERMABOND SKIN ADH 0.7ML -- DERMABOND ADVANCED 12/BX

## (undated) DEVICE — CONTAINER SPEC FRMLN 120ML --

## (undated) DEVICE — 3M™ TEGADERM™ TRANSPARENT FILM DRESSING FRAME STYLE, 1624W, 2-3/8 IN X 2-3/4 IN (6 CM X 7 CM), 100/CT 4CT/CASE: Brand: 3M™ TEGADERM™

## (undated) DEVICE — TROCAR: Brand: KII FIOS FIRST ENTRY

## (undated) DEVICE — SUT ETHBND 0 30IN SH GRN --

## (undated) DEVICE — Device

## (undated) DEVICE — GENERAL LAPAROSCOPY: Brand: MEDLINE INDUSTRIES, INC.

## (undated) DEVICE — BAG SPEC REM 224ML W4XL6IN DIA10MM 1 HND GYN DISP ENDOPCH

## (undated) DEVICE — DRAPE TWL SURG 16X26IN BLU ORB04] ALLCARE INC]

## (undated) DEVICE — TROCAR: Brand: KII® SLEEVE

## (undated) DEVICE — SOLUTION IV 1000ML 0.9% SOD CHL

## (undated) DEVICE — SHEARS ENDOSCP L36CM DIA5MM ULTRASONIC CRV TIP W/ ADV

## (undated) DEVICE — TROCARS: Brand: KII® BLUNT TIP ACCESS SYSTEM

## (undated) DEVICE — E-Z CLEAN, PTFE COATED, ELECTROSURGICAL LAPAROSCOPIC ELECTRODE, SPATULA, 33 CM., SINGLE-USE, FOR USE WITH HAND CONTROL PENCIL: Brand: MEGADYNE

## (undated) DEVICE — KIT,ANTI FOG,W/SPONGE & FLUID,SOFT PACK: Brand: MEDLINE

## (undated) DEVICE — 2000CC GUARDIAN II: Brand: GUARDIAN

## (undated) DEVICE — SUTURE SZ 0 27IN 5/8 CIR UR-6  TAPER PT VIOLET ABSRB VICRYL J603H

## (undated) DEVICE — GAUZE,SPONGE,4"X4",16PLY,STRL,LF,10/TRAY: Brand: MEDLINE

## (undated) DEVICE — TROCAR: Brand: KII® OPTICAL ACCESS SYSTEM

## (undated) DEVICE — 2, DISPOSABLE SUCTION/IRRIGATOR WITHOUT DISPOSABLE TIP: Brand: STRYKEFLOW

## (undated) DEVICE — NEEDLE HYPO 21GA L1.5IN INTRAMUSCULAR S STL LATCH BVL UP

## (undated) DEVICE — [HIGH FLOW INSUFFLATOR,  DO NOT USE IF PACKAGE IS DAMAGED,  KEEP DRY,  KEEP AWAY FROM SUNLIGHT,  PROTECT FROM HEAT AND RADIOACTIVE SOURCES.]: Brand: PNEUMOSURE